# Patient Record
Sex: FEMALE | Race: WHITE | NOT HISPANIC OR LATINO | Employment: UNEMPLOYED | URBAN - NONMETROPOLITAN AREA
[De-identification: names, ages, dates, MRNs, and addresses within clinical notes are randomized per-mention and may not be internally consistent; named-entity substitution may affect disease eponyms.]

---

## 2021-08-25 ENCOUNTER — HOSPITAL ENCOUNTER (EMERGENCY)
Facility: HOSPITAL | Age: 31
Discharge: HOME | End: 2021-08-25
Attending: EMERGENCY MEDICINE
Payer: COMMERCIAL

## 2021-08-25 VITALS
BODY MASS INDEX: 23.55 KG/M2 | HEIGHT: 71 IN | HEART RATE: 105 BPM | SYSTOLIC BLOOD PRESSURE: 153 MMHG | DIASTOLIC BLOOD PRESSURE: 95 MMHG | TEMPERATURE: 98.7 F | RESPIRATION RATE: 19 BRPM | WEIGHT: 168.21 LBS | OXYGEN SATURATION: 99 %

## 2021-08-25 DIAGNOSIS — L30.9 DERMATITIS: Primary | ICD-10-CM

## 2021-08-25 PROCEDURE — 99284 EMERGENCY DEPT VISIT MOD MDM: CPT | Performed by: PHYSICIAN ASSISTANT

## 2021-08-25 PROCEDURE — 99283 EMERGENCY DEPT VISIT LOW MDM: CPT

## 2021-08-25 RX ORDER — PREDNISONE 20 MG/1
40 TABLET ORAL ONCE
Status: COMPLETED | OUTPATIENT
Start: 2021-08-25 | End: 2021-08-25

## 2021-08-25 RX ORDER — PREDNISONE 20 MG/1
40 TABLET ORAL DAILY
Qty: 8 TABLET | Refills: 0 | Status: SHIPPED | OUTPATIENT
Start: 2021-08-25 | End: 2021-08-29

## 2021-08-25 RX ADMIN — PREDNISONE 40 MG: 20 TABLET ORAL at 15:32

## 2021-08-25 NOTE — ED PROVIDER NOTES
History  Chief Complaint   Patient presents with    Rash     Pt reports an itchy red pin point rash noted to torso and legs since friday  Pt tried benadryl and OTC cortisone cream however no relief     27year old female presented to the ED for evaluation of rash  States she has a "red bumpy" rash on her torso and legs  Patient states rash is itchy  Denies any weeping, fevers, chills  States she has been taking benadryl and OTC cortisone cream without significant improvement  Notes she does spend time outside  She denies any new foods or personal hygiene products  No one at home with similar rash  No rash between fingers or toes        History provided by:  Parent  Rash  Location:  Torso and leg  Quality: itchiness and redness    Quality: not blistering, not bruising, not burning, not draining, not dry, not painful, not peeling, not scaling, not swelling and not weeping    Severity:  Moderate  Onset quality:  Gradual  Timing:  Constant  Progression:  Unchanged  Chronicity:  New  Context: plant contact and sun exposure    Context: not animal contact, not chemical exposure, not exposure to similar rash, not food, not medications, not new detergent/soap and not sick contacts    Relieved by:  Nothing  Worsened by:  Nothing  Ineffective treatments:  OTC analgesics  Associated symptoms: no abdominal pain, no diarrhea, no fatigue, no fever, no headaches, no hoarse voice, no induration, no joint pain, no myalgias, no nausea, no periorbital edema, no shortness of breath, no sore throat, no throat swelling, no tongue swelling, no URI, not vomiting and not wheezing        None       Past Medical History:   Diagnosis Date    Asthma        Past Surgical History:   Procedure Laterality Date    TONSILLECTOMY         No family history on file  I have reviewed and agree with the history as documented      E-Cigarette/Vaping     E-Cigarette/Vaping Substances     Social History     Tobacco Use    Smoking status: Never Smoker    Smokeless tobacco: Never Used   Substance Use Topics    Alcohol use: Not Currently    Drug use: Not Currently       Review of Systems   Constitutional: Negative  Negative for appetite change, chills, diaphoresis, fatigue and fever  HENT: Negative  Negative for hoarse voice and sore throat  Respiratory: Negative  Negative for shortness of breath and wheezing  Cardiovascular: Negative  Gastrointestinal: Negative  Negative for abdominal pain, diarrhea, nausea and vomiting  Musculoskeletal: Negative  Negative for arthralgias and myalgias  Skin: Positive for rash  Neurological: Negative  Negative for headaches  All other systems reviewed and are negative  Physical Exam  Physical Exam  Vitals and nursing note reviewed  Constitutional:       General: She is not in acute distress  Appearance: Normal appearance  She is normal weight  She is not ill-appearing, toxic-appearing or diaphoretic  HENT:      Head: Normocephalic and atraumatic  Nose: Nose normal       Mouth/Throat:      Mouth: Mucous membranes are moist       Pharynx: Oropharynx is clear  No oropharyngeal exudate or posterior oropharyngeal erythema  Eyes:      Extraocular Movements: Extraocular movements intact  Conjunctiva/sclera: Conjunctivae normal       Pupils: Pupils are equal, round, and reactive to light  Cardiovascular:      Rate and Rhythm: Normal rate and regular rhythm  Pulmonary:      Effort: Pulmonary effort is normal  No respiratory distress  Breath sounds: Normal breath sounds  No stridor  No wheezing, rhonchi or rales  Chest:      Chest wall: No tenderness  Musculoskeletal:         General: No swelling or tenderness  Normal range of motion  Cervical back: Normal range of motion  Skin:     General: Skin is warm and dry  Capillary Refill: Capillary refill takes less than 2 seconds  Findings: Rash present  Rash is macular and papular   Rash is not crusting, nodular, purpuric, pustular, scaling or urticarial       Comments: Diffuse maculopapular erythematous rash on torso and lower extremities  No rash between fingers or toes  Does not involve face  No swelling, drainage, warmth, tenderness  No open wounds   Neurological:      General: No focal deficit present  Mental Status: She is alert and oriented to person, place, and time  Psychiatric:         Mood and Affect: Mood normal          Behavior: Behavior normal          Vital Signs  ED Triage Vitals [08/25/21 1517]   Temperature Pulse Respirations Blood Pressure SpO2   98 7 °F (37 1 °C) 105 19 153/95 99 %      Temp Source Heart Rate Source Patient Position - Orthostatic VS BP Location FiO2 (%)   Temporal Monitor Sitting Right arm --      Pain Score       No Pain           Vitals:    08/25/21 1517   BP: 153/95   Pulse: 105   Patient Position - Orthostatic VS: Sitting         Visual Acuity      ED Medications  Medications   predniSONE tablet 40 mg (40 mg Oral Given 8/25/21 1532)       Diagnostic Studies  Results Reviewed     None                 No orders to display              Procedures  Procedures         ED Course                 MDM  Number of Diagnoses or Management Options  Dermatitis: new and requires workup  Diagnosis management comments: 27-year-old female presents emergency department for evaluation of rash  Vitals and medical record reviewed  On exam patient has maculopapular rash torso and legs  No warmth, open wounds, tenderness or signs of infection  Patient denies any fevers or chills  There is no drainage or area of fluctuance  Patient reports rash is itchy  Will treat dermatitis with prednisone  Will follow-up with PCP  Patient was educated on symptoms that require prompt return to the emergency department for further evaluation and verbalized understanding           Amount and/or Complexity of Data Reviewed  Review and summarize past medical records: yes        Disposition  Final diagnoses:

## 2021-08-25 NOTE — DISCHARGE INSTRUCTIONS
If you have any new or worsening symptoms please return immediately to the emergency department  Please continue to take over-the-counter Benadryl as needed for itch    Please follow-up with your family doctor

## 2022-11-21 ENCOUNTER — APPOINTMENT (EMERGENCY)
Dept: RADIOLOGY | Facility: HOSPITAL | Age: 32
End: 2022-11-21

## 2022-11-21 ENCOUNTER — OFFICE VISIT (OUTPATIENT)
Dept: URGENT CARE | Facility: CLINIC | Age: 32
End: 2022-11-21

## 2022-11-21 ENCOUNTER — APPOINTMENT (OUTPATIENT)
Dept: URGENT CARE | Facility: CLINIC | Age: 32
End: 2022-11-21

## 2022-11-21 ENCOUNTER — HOSPITAL ENCOUNTER (EMERGENCY)
Facility: HOSPITAL | Age: 32
Discharge: HOME | End: 2022-11-21
Attending: EMERGENCY MEDICINE

## 2022-11-21 ENCOUNTER — APPOINTMENT (EMERGENCY)
Dept: CT IMAGING | Facility: HOSPITAL | Age: 32
End: 2022-11-21

## 2022-11-21 ENCOUNTER — APPOINTMENT (EMERGENCY)
Dept: ULTRASOUND IMAGING | Facility: HOSPITAL | Age: 32
End: 2022-11-21

## 2022-11-21 VITALS
SYSTOLIC BLOOD PRESSURE: 127 MMHG | RESPIRATION RATE: 20 BRPM | DIASTOLIC BLOOD PRESSURE: 73 MMHG | HEIGHT: 72 IN | BODY MASS INDEX: 22.81 KG/M2 | HEART RATE: 125 BPM | TEMPERATURE: 102.4 F | OXYGEN SATURATION: 100 %

## 2022-11-21 VITALS
OXYGEN SATURATION: 98 % | BODY MASS INDEX: 30.49 KG/M2 | RESPIRATION RATE: 31 BRPM | TEMPERATURE: 99.8 F | SYSTOLIC BLOOD PRESSURE: 129 MMHG | HEIGHT: 72 IN | HEART RATE: 110 BPM | WEIGHT: 225.09 LBS | DIASTOLIC BLOOD PRESSURE: 63 MMHG

## 2022-11-21 VITALS
SYSTOLIC BLOOD PRESSURE: 123 MMHG | TEMPERATURE: 102.4 F | OXYGEN SATURATION: 98 % | DIASTOLIC BLOOD PRESSURE: 76 MMHG | HEART RATE: 124 BPM | BODY MASS INDEX: 22.81 KG/M2 | HEIGHT: 72 IN | RESPIRATION RATE: 20 BRPM

## 2022-11-21 DIAGNOSIS — R31.9 HEMATURIA, UNSPECIFIED TYPE: ICD-10-CM

## 2022-11-21 DIAGNOSIS — D25.9 UTERINE FIBROID: ICD-10-CM

## 2022-11-21 DIAGNOSIS — R50.9 FEVER: ICD-10-CM

## 2022-11-21 DIAGNOSIS — B34.9 VIRAL SYNDROME: Primary | ICD-10-CM

## 2022-11-21 DIAGNOSIS — U07.1 COVID: Primary | ICD-10-CM

## 2022-11-21 LAB
ALBUMIN SERPL BCP-MCNC: 4.1 G/DL (ref 3.5–5)
ALP SERPL-CCNC: 91 U/L (ref 46–116)
ALT SERPL W P-5'-P-CCNC: 24 U/L (ref 12–78)
ANION GAP SERPL CALCULATED.3IONS-SCNC: 12 MMOL/L (ref 4–13)
AST SERPL W P-5'-P-CCNC: 32 U/L (ref 5–45)
BACTERIA UR QL AUTO: NORMAL /HPF
BASOPHILS # BLD AUTO: 0.02 THOUSANDS/ÂΜL (ref 0–0.1)
BASOPHILS NFR BLD AUTO: 0 % (ref 0–1)
BILIRUB SERPL-MCNC: 0.56 MG/DL (ref 0.2–1)
BILIRUB UR QL STRIP: NEGATIVE
BUN SERPL-MCNC: 9 MG/DL (ref 5–25)
CALCIUM SERPL-MCNC: 9 MG/DL (ref 8.3–10.1)
CARDIAC TROPONIN I PNL SERPL HS: <2 NG/L
CHLORIDE SERPL-SCNC: 100 MMOL/L (ref 96–108)
CLARITY UR: CLEAR
CO2 SERPL-SCNC: 21 MMOL/L (ref 21–32)
COLOR UR: ABNORMAL
CREAT SERPL-MCNC: 0.97 MG/DL (ref 0.6–1.3)
EOSINOPHIL # BLD AUTO: 0.04 THOUSAND/ÂΜL (ref 0–0.61)
EOSINOPHIL NFR BLD AUTO: 1 % (ref 0–6)
ERYTHROCYTE [DISTWIDTH] IN BLOOD BY AUTOMATED COUNT: 13.4 % (ref 11.6–15.1)
EXT PREGNANCY TEST URINE: NEGATIVE
EXT. CONTROL: NORMAL
FLUAV RNA RESP QL NAA+PROBE: NEGATIVE
FLUBV RNA RESP QL NAA+PROBE: NEGATIVE
GFR SERPL CREATININE-BSD FRML MDRD: 76 ML/MIN/1.73SQ M
GLUCOSE SERPL-MCNC: 110 MG/DL (ref 65–140)
GLUCOSE UR STRIP-MCNC: NEGATIVE MG/DL
HCT VFR BLD AUTO: 31.1 % (ref 34.8–46.1)
HGB BLD-MCNC: 10.7 G/DL (ref 11.5–15.4)
HGB UR QL STRIP.AUTO: ABNORMAL
IMM GRANULOCYTES # BLD AUTO: 0.02 THOUSAND/UL (ref 0–0.2)
IMM GRANULOCYTES NFR BLD AUTO: 0 % (ref 0–2)
KETONES UR STRIP-MCNC: ABNORMAL MG/DL
LACTATE SERPL-SCNC: 0.8 MMOL/L (ref 0.5–2)
LACTATE SERPL-SCNC: 2.4 MMOL/L (ref 0.5–2)
LEUKOCYTE ESTERASE UR QL STRIP: NEGATIVE
LIPASE SERPL-CCNC: 111 U/L (ref 73–393)
LYMPHOCYTES # BLD AUTO: 0.24 THOUSANDS/ÂΜL (ref 0.6–4.47)
LYMPHOCYTES NFR BLD AUTO: 3 % (ref 14–44)
MCH RBC QN AUTO: 28.3 PG (ref 26.8–34.3)
MCHC RBC AUTO-ENTMCNC: 34.4 G/DL (ref 31.4–37.4)
MCV RBC AUTO: 82 FL (ref 82–98)
MONOCYTES # BLD AUTO: 0.75 THOUSAND/ÂΜL (ref 0.17–1.22)
MONOCYTES NFR BLD AUTO: 10 % (ref 4–12)
NEUTROPHILS # BLD AUTO: 6.32 THOUSANDS/ÂΜL (ref 1.85–7.62)
NEUTS SEG NFR BLD AUTO: 86 % (ref 43–75)
NITRITE UR QL STRIP: NEGATIVE
NON-SQ EPI CELLS URNS QL MICRO: NORMAL /HPF
NRBC BLD AUTO-RTO: 0 /100 WBCS
PH UR STRIP.AUTO: 6 [PH]
PLATELET # BLD AUTO: 224 THOUSANDS/UL (ref 149–390)
PMV BLD AUTO: 8.3 FL (ref 8.9–12.7)
POTASSIUM SERPL-SCNC: 3.9 MMOL/L (ref 3.5–5.3)
PROCALCITONIN SERPL-MCNC: 0.1 NG/ML
PROT SERPL-MCNC: 7.8 G/DL (ref 6.4–8.4)
PROT UR STRIP-MCNC: NEGATIVE MG/DL
RBC # BLD AUTO: 3.78 MILLION/UL (ref 3.81–5.12)
RBC #/AREA URNS AUTO: NORMAL /HPF
RSV RNA RESP QL NAA+PROBE: NEGATIVE
SARS-COV-2 RNA RESP QL NAA+PROBE: POSITIVE
SL AMB  POCT GLUCOSE, UA: NEGATIVE
SL AMB LEUKOCYTE ESTERASE,UA: NEGATIVE
SL AMB POCT BILIRUBIN,UA: NEGATIVE
SL AMB POCT BLOOD,UA: ABNORMAL
SL AMB POCT CLARITY,UA: CLEAR
SL AMB POCT COLOR,UA: YELLOW
SL AMB POCT KETONES,UA: NEGATIVE
SL AMB POCT NITRITE,UA: NEGATIVE
SL AMB POCT PH,UA: 8
SL AMB POCT SPECIFIC GRAVITY,UA: 1.01
SL AMB POCT URINE PROTEIN: NEGATIVE
SL AMB POCT UROBILINOGEN: NEGATIVE
SODIUM SERPL-SCNC: 133 MMOL/L (ref 135–147)
SP GR UR STRIP.AUTO: <=1.005 (ref 1–1.03)
UROBILINOGEN UR QL STRIP.AUTO: 0.2 E.U./DL
WBC # BLD AUTO: 7.39 THOUSAND/UL (ref 4.31–10.16)
WBC #/AREA URNS AUTO: NORMAL /HPF

## 2022-11-21 RX ORDER — KETOROLAC TROMETHAMINE 30 MG/ML
15 INJECTION, SOLUTION INTRAMUSCULAR; INTRAVENOUS ONCE
Status: COMPLETED | OUTPATIENT
Start: 2022-11-21 | End: 2022-11-21

## 2022-11-21 RX ORDER — SODIUM CHLORIDE 9 MG/ML
3 INJECTION INTRAVENOUS EVERY 12 HOURS SCHEDULED
Status: DISCONTINUED | OUTPATIENT
Start: 2022-11-21 | End: 2022-11-21 | Stop reason: HOSPADM

## 2022-11-21 RX ORDER — OMEPRAZOLE 40 MG/1
40 CAPSULE, DELAYED RELEASE ORAL DAILY
COMMUNITY

## 2022-11-21 RX ORDER — ONDANSETRON 2 MG/ML
4 INJECTION INTRAMUSCULAR; INTRAVENOUS ONCE
Status: COMPLETED | OUTPATIENT
Start: 2022-11-21 | End: 2022-11-21

## 2022-11-21 RX ORDER — ONDANSETRON 4 MG/1
4 TABLET, ORALLY DISINTEGRATING ORAL EVERY 6 HOURS PRN
Qty: 20 TABLET | Refills: 0 | Status: SHIPPED | OUTPATIENT
Start: 2022-11-21

## 2022-11-21 RX ORDER — ACETAMINOPHEN 325 MG/1
650 TABLET ORAL ONCE
Status: COMPLETED | OUTPATIENT
Start: 2022-11-21 | End: 2022-11-21

## 2022-11-21 RX ADMIN — SODIUM CHLORIDE, POTASSIUM CHLORIDE, SODIUM LACTATE AND CALCIUM CHLORIDE 1000 ML: 600; 310; 30; 20 INJECTION, SOLUTION INTRAVENOUS at 16:37

## 2022-11-21 RX ADMIN — ACETAMINOPHEN 650 MG: 325 TABLET ORAL at 15:25

## 2022-11-21 RX ADMIN — IOHEXOL 100 ML: 350 INJECTION, SOLUTION INTRAVENOUS at 15:54

## 2022-11-21 RX ADMIN — KETOROLAC TROMETHAMINE 15 MG: 30 INJECTION, SOLUTION INTRAMUSCULAR at 18:49

## 2022-11-21 RX ADMIN — ONDANSETRON 4 MG: 2 INJECTION INTRAMUSCULAR; INTRAVENOUS at 15:22

## 2022-11-21 RX ADMIN — SODIUM CHLORIDE 1000 ML: 0.9 INJECTION, SOLUTION INTRAVENOUS at 15:24

## 2022-11-21 RX ADMIN — ONDANSETRON 4 MG: 2 INJECTION INTRAMUSCULAR; INTRAVENOUS at 18:50

## 2022-11-21 NOTE — Clinical Note
Drake Andrei was seen and treated in our emergency department on 11/21/2022  Diagnosis:     Bridgett Fonseca  may return to work on return date  She may return on this date: 11/26/2022    She tested POSITIVE for COVID today (11/21/22) and she needs to quarantine and isolate for 5 days  If you have any questions or concerns, please don't hesitate to call        Poonam Bryant MD    ______________________________           _______________          _______________  Hospital Representative                              Date                                Time

## 2022-11-21 NOTE — PROGRESS NOTES
[facility]        NAME: Smitty Peabody is a 34 y o  female  : 1990    MRN: [id number]  DATE: 2022  TIME: 2:30 PM    Assessment and Plan   Viral syndrome [B34 9]  1  Viral syndrome  POCT urine dip    Cov/Flu-Collected at [facility]      2  Hematuria, unspecified type  Transfer to other facility        Point of care urine dip showing moderate blood, negative nitrites, and negative leukocytes  COVID/Flu PCR pending  Results will be viewable via [employer]  Symptoms could be related to viral etiology however given moderate hematuria and acute onset of symptoms, concern for potential pyelonephritis or nephrolithiasis  Patient requires higher level of care and potential imaging  Patient referred to emergency department for further evaluation and management of her symptoms  Patient agreeable to present to ED  Complete assessment deferred to ED  Patient Instructions       COVID/Flu PCR pending  Results will be available via [employer]  Moderate blood on urine dip  Proceed to ER for further evaluation and management of your symptoms     Chief Complaint     Chief Complaint   Patient presents with   • Cold Like Symptoms     C/o body aches, chills, and upper abdominal pain radiating to her back since this morning  Was tested for covid this morning which came back negative  History of Present Illness       71-year-old female presents with complaints of sudden onset left-sided abdominal pain, back pain, body ache, nausea, fever, and chills x1 day  Denies any diarrhea but states loose stools at baseline  She further denies any vomiting or urinary symptoms  She states positive sick contacts as son is currently ill  She also admits to sore throat and some congestion last week, which has since resolved  Review of Systems   Review of Systems   Constitutional: Positive for chills and fever     HENT: Negative for congestion, postnasal drip, sore throat and trouble swallowing  Respiratory: Negative for shortness of breath  Cardiovascular: Negative for chest pain  Gastrointestinal: Positive for abdominal pain and nausea  Negative for diarrhea and vomiting  Genitourinary: Negative for difficulty urinating and dysuria  Musculoskeletal: Positive for back pain and myalgias  Current Medications       Current Outpatient Medications:   •  omeprazole (PriLOSEC) 40 MG capsule, Take 40 mg by mouth daily, Disp: , Rfl:     Current Allergies     Allergies as of 11/21/2022   • (No Known Allergies)            The following portions of the patient's history were reviewed and updated as appropriate: allergies, current medications, past family history, past medical history, past social history, past surgical history and problem list      Past Medical History:   Diagnosis Date   • Asthma        Past Surgical History:   Procedure Laterality Date   • TONSILLECTOMY         Family History   Problem Relation Age of Onset   • Hyperlipidemia Mother    • Diabetes Mother    • No Known Problems Father          Medications have been verified  Objective   /76   Pulse (!) 124   Temp (!) 102 4 °F (39 1 °C)   Resp 20   Ht 6' (1 829 m)   SpO2 98%   BMI 22 81 kg/m²   No LMP recorded  Patient has had an implant  Physical Exam     Physical Exam  Vitals and nursing note reviewed  Constitutional:       General: She is not in acute distress  Appearance: She is not toxic-appearing  HENT:      Head: Normocephalic  Eyes:      Conjunctiva/sclera: Conjunctivae normal    Cardiovascular:      Rate and Rhythm: Regular rhythm  Tachycardia present  Heart sounds: Normal heart sounds  Pulmonary:      Effort: Pulmonary effort is normal  No respiratory distress  Breath sounds: Normal breath sounds  No stridor  No wheezing, rhonchi or rales  Abdominal:      General: Bowel sounds are normal       Palpations: Abdomen is soft  Tenderness:  There is abdominal tenderness in the epigastric area, suprapubic area, left upper quadrant and left lower quadrant  There is right CVA tenderness and left CVA tenderness  Musculoskeletal:         General: Tenderness present  Lumbar back: Tenderness present  No bony tenderness  Skin:     General: Skin is warm and dry  Neurological:      Mental Status: She is alert  Gait: Gait is intact     Psychiatric:         Mood and Affect: Mood normal          Behavior: Behavior normal

## 2022-11-21 NOTE — PATIENT INSTRUCTIONS
COVID/Flu PCR pending  Results will be available via [employer]     Moderate blood on urine dip  Proceed to ER for further evaluation and management of your symptoms

## 2022-11-21 NOTE — Clinical Note
Teddy Gonzalez was seen and treated in our emergency department on 11/21/2022  Diagnosis:     Herminia Ramon  may return to work on return date  She may return on this date: 11/26/2022         If you have any questions or concerns, please don't hesitate to call        Jono Benavides MD    ______________________________           _______________          _______________  Hospital Representative                              Date                                Time

## 2022-11-21 NOTE — DISCHARGE INSTRUCTIONS
Your ultrasound showed a submucosal uterine fibroid which should be followed up by your OB/GYN  You also tested positive for COVID  Quarantine for 5 days from others from the onset of symptoms  This would be from 11/21-11/25 (5 full days)  Isolation separates sick people with a contagious disease from people who are not sick  Quarantine separates and restricts the movement of people who were exposed to a contagious disease to see if they become sick  -------------------------------------------------------------  IF your symptoms are improving,  You may end isolation after day 5 if:  You are fever-free for 24 hours (without the use of fever-reducing medication)  IF your symptoms are not improving,  Continue to isolate until:  You are fever-free for 24 hours (without the use of fever-reducing medication)  Your symptoms are improving   -------------------------------------------------------------  IF you had symptoms and had: Moderate illness (you experienced shortness of breath or had difficulty breathing)  - You need to isolate through day 10  Severe illness (you were hospitalized) or have a weakened immune system  You need to isolate through day 10  Consult your doctor before ending isolation  Ending isolation without a viral test may not be an option for you   --------------------------------------------------------------    Regardless of when you end isolation    Until at least day 11:  Avoid being around people who are more likely to get very sick from COVID-19  Remember to wear a high-quality mask when indoors around others at home and in public  Do not go places where you are unable to wear a mask until you are able to discontinue masking (see below)  After you have ended isolation, when you are feeling better (no fever without the use of fever-reducing medications and symptoms improving),  Wear your mask through day 10    OR  If you have access to antigen tests, you should consider using them  With two sequential negative tests 48 hours apart, you may remove your mask sooner than day 10       [employer]    [email or web address]  [email or web address]    COVID 23 Hotlines:  [address]    [address] [facility] [phone] Option #7

## 2022-11-21 NOTE — ED PROVIDER NOTES
History  Chief Complaint   Patient presents with   • Back Pain     Pt reports lower back pain, nausea, vomiting, and fevers, [place] [facility] [place]      HPI  32F w hx of asthma presenting with back pain and fevers  Symptoms started this morning at 930am while patient was working  Developed sudden onset of chills, fevers, back pain  Pain described as sharp sensation and radiates to lower back and upper back  +headaches, myalgias, and shortness of breath  She went to urgent care today and had urine testing done; patient was told she had blood in her urine and advised to be seen in the ED  Did not take any medications prior to coming to the ED  Says she has had kidney infection years ago, and symptoms feel similar  Never had kidney stones  Denies cough, rhinorrhea, dysuria  Prior to Admission Medications   Prescriptions Last Dose Informant Patient Reported? Taking?   omeprazole (PriLOSEC) 40 MG capsule   Yes No   Sig: Take 40 mg by mouth daily      Facility-Administered Medications: None       Past Medical History:   Diagnosis Date   • Asthma        Past Surgical History:   Procedure Laterality Date   • TONSILLECTOMY         Family History   Problem Relation Age of Onset   • Hyperlipidemia Mother    • Diabetes Mother    • No Known Problems Father      I have reviewed and agree with the history as documented  E-Cigarette/Vaping   • E-Cigarette Use Never User      E-Cigarette/Vaping Substances     Social History     Tobacco Use   • Smoking status: Never   • Smokeless tobacco: Never   Vaping Use   • Vaping Use: Never used   Substance Use Topics   • Alcohol use: Not Currently   • Drug use: Yes     Types: Marijuana     Comment: Medical card       Review of Systems   Constitutional: Positive for appetite change, chills and fever  HENT: Negative for ear pain and sore throat  Eyes: Negative for pain and visual disturbance  Respiratory: Negative for cough and shortness of breath      Cardiovascular: Negative for chest pain and palpitations  Gastrointestinal: Positive for abdominal pain and nausea  Negative for vomiting  Genitourinary: Positive for flank pain and hematuria  Negative for dysuria  Musculoskeletal: Positive for back pain and myalgias  Negative for arthralgias  Skin: Negative for color change and rash  Neurological: Positive for headaches  Negative for seizures and syncope  All other systems reviewed and are negative  Physical Exam  Physical Exam  Vitals and nursing note reviewed  Constitutional:       General: She is not in acute distress  Appearance: She is well-developed  HENT:      Head: Normocephalic and atraumatic  Right Ear: External ear normal       Left Ear: External ear normal       Nose: Nose normal    Eyes:      Conjunctiva/sclera: Conjunctivae normal    Cardiovascular:      Rate and Rhythm: Regular rhythm  Tachycardia present  Pulmonary:      Effort: Pulmonary effort is normal  No respiratory distress  Breath sounds: Normal breath sounds  Abdominal:      Palpations: Abdomen is soft  Tenderness: There is abdominal tenderness in the epigastric area and periumbilical area  There is right CVA tenderness and left CVA tenderness  Musculoskeletal:      Cervical back: Normal range of motion and neck supple  Skin:     General: Skin is warm and dry  Neurological:      General: No focal deficit present  Mental Status: She is alert  Mental status is at baseline         Vital Signs  ED Triage Vitals   Temperature Pulse Respirations Blood Pressure SpO2   11/21/22 1457 11/21/22 1457 11/21/22 1457 11/21/22 1457 11/21/22 1457   (!) 101 6 °F (38 7 °C) (!) 138 20 153/72 99 %      Temp Source Heart Rate Source Patient Position - Orthostatic VS BP Location FiO2 (%)   11/21/22 1457 11/21/22 1457 11/21/22 1457 11/21/22 1457 --   Temporal Monitor Sitting Left arm       Pain Score       11/21/22 1525       Med Not Given for Pain - for MAR use only           Vitals:    11/21/22 1915 11/21/22 1930 11/21/22 1945 11/21/22 2000   BP: 143/57 149/65 129/63    Pulse: 101 (!) 112 103 (!) 110   Patient Position - Orthostatic VS:           Visual Acuity      ED Medications  Medications   acetaminophen (TYLENOL) tablet 650 mg (650 mg Oral Given 11/21/22 1525)   sodium chloride 0 9 % bolus 1,000 mL (0 mL Intravenous Stopped 11/21/22 1625)   ondansetron (ZOFRAN) injection 4 mg (4 mg Intravenous Given 11/21/22 1522)   iohexol (OMNIPAQUE) 350 MG/ML injection (SINGLE-DOSE) 100 mL (100 mL Intravenous Given 11/21/22 1554)   lactated ringers bolus 1,000 mL (0 mL Intravenous Stopped 11/21/22 1810)   ondansetron (ZOFRAN) injection 4 mg (4 mg Intravenous Given 11/21/22 1850)   ketorolac (TORADOL) injection 15 mg (15 mg Intravenous Given 11/21/22 1849)       Diagnostic Studies  Results Reviewed     Procedure Component Value Units Date/Time    FLU/RSV/COVID - if FLU/RSV clinically relevant [[id number]]  (Abnormal) Collected: 11/21/22 1735    Lab Status: Final result Specimen: Nares from Nose Updated: 11/21/22 1821     SARS-CoV-2 Positive     INFLUENZA A PCR Negative     INFLUENZA B PCR Negative     RSV PCR Negative    Narrative:      FOR PEDIATRIC PATIENTS - copy/paste COVID Guidelines URL to browser: [email or web address]    SARS-CoV-2 assay is a Nucleic Acid Amplification assay intended for the  qualitative detection of nucleic acid from SARS-CoV-2 in nasopharyngeal  swabs  Results are for the presumptive identification of SARS-CoV-2 RNA  Positive results are indicative of infection with SARS-CoV-2, the virus  causing COVID-19, but do not rule out bacterial infection or co-infection  with other viruses  Laboratories within the [place] and its  territories are required to report all positive results to the appropriate  public health authorities   Negative results do not preclude SARS-CoV-2  infection and should not be used as the sole basis for treatment or other  patient management decisions  Negative results must be combined with  clinical observations, patient history, and epidemiological information  This test has not been FDA cleared or approved  This test has been authorized by FDA under an Emergency Use Authorization  (EUA)  This test is only authorized for the duration of time the  declaration that circumstances exist justifying the authorization of the  emergency use of an in vitro diagnostic tests for detection of SARS-CoV-2  virus and/or diagnosis of COVID-19 infection under section 564(b)(1) of  the Act, 21 U  S C  710BSX-4(B)(8), unless the authorization is terminated  or revoked sooner  The test has been validated but independent review by FDA  and CLIA is pending  Test performed using [employer] GeneXpert: This RT-PCR assay targets N2,  a region unique to SARS-CoV-2  A conserved region in the E-gene was chosen  for pan-Sarbecovirus detection which includes SARS-CoV-2  According to CMS-2020-01-R, this platform meets the definition of high-throughput technology  Procalcitonin [[id number]]  (Normal) Collected: 11/21/22 1513    Lab Status: Final result Specimen: Blood from Arm, Left Updated: 11/21/22 1815     Procalcitonin 0 10 ng/ml     Lactic acid 2 Hours [[id number]]  (Normal) Collected: 11/21/22 1735    Lab Status: Final result Specimen: Blood from Hand, Left Updated: 11/21/22 1801     LACTIC ACID 0 8 mmol/L     Narrative:      Result may be elevated if tourniquet was used during collection      CBC and differential [[id number]]  (Abnormal) Collected: 11/21/22 1625    Lab Status: Final result Specimen: Blood from Hand, Left Updated: 11/21/22 1629     WBC 7 39 Thousand/uL      RBC 3 78 Million/uL      Hemoglobin 10 7 g/dL      Hematocrit 31 1 %      MCV 82 fL      MCH 28 3 pg      MCHC 34 4 g/dL      RDW 13 4 %      MPV 8 3 fL      Platelets 082 Thousands/uL      nRBC 0 /100 WBCs      Neutrophils Relative 86 %      Immat GRANS % 0 % Lymphocytes Relative 3 %      Monocytes Relative 10 %      Eosinophils Relative 1 %      Basophils Relative 0 %      Neutrophils Absolute 6 32 Thousands/µL      Immature Grans Absolute 0 02 Thousand/uL      Lymphocytes Absolute 0 24 Thousands/µL      Monocytes Absolute 0 75 Thousand/µL      Eosinophils Absolute 0 04 Thousand/µL      Basophils Absolute 0 02 Thousands/µL     Lactic acid [[id number]]  (Abnormal) Collected: 11/21/22 1513    Lab Status: Final result Specimen: Blood from Arm, Left Updated: 11/21/22 1558     LACTIC ACID 2 4 mmol/L     Narrative:      Result may be elevated if tourniquet was used during collection      Urine Microscopic [[id number]]  (Normal) Collected: 11/21/22 1531    Lab Status: Final result Specimen: Urine, Clean Catch Updated: 11/21/22 1558     RBC, UA 2-4 /hpf      WBC, UA 0-1 /hpf      Epithelial Cells Occasional /hpf      Bacteria, UA None Seen /hpf     HS Troponin 0hr (reflex protocol) [[id number]]  (Normal) Collected: 11/21/22 1513    Lab Status: Final result Specimen: Blood from Arm, Left Updated: 11/21/22 1552     hs TnI 0hr <2 ng/L     Comprehensive metabolic panel [[id number]]  (Abnormal) Collected: 11/21/22 1513    Lab Status: Final result Specimen: Blood from Arm, Left Updated: 11/21/22 1548     Sodium 133 mmol/L      Potassium 3 9 mmol/L      Chloride 100 mmol/L      CO2 21 mmol/L      ANION GAP 12 mmol/L      BUN 9 mg/dL      Creatinine 0 97 mg/dL      Glucose 110 mg/dL      Calcium 9 0 mg/dL      AST 32 U/L      ALT 24 U/L      Alkaline Phosphatase 91 U/L      Total Protein 7 8 g/dL      Albumin 4 1 g/dL      Total Bilirubin 0 56 mg/dL      eGFR 76 ml/min/1 73sq m     Narrative:      [facility] guidelines for Chronic Kidney Disease (CKD):   •  Stage 1 with normal or high GFR (GFR > 90 mL/min/1 73 square meters)  •  Stage 2 Mild CKD (GFR = 60-89 mL/min/1 73 square meters)  •  Stage 3A Moderate CKD (GFR = 45-59 mL/min/1 73 square meters)  •  Stage 3B Moderate CKD (GFR = 30-44 mL/min/1 73 square meters)  •  Stage 4 Severe CKD (GFR = 15-29 mL/min/1 73 square meters)  •  Stage 5 End Stage CKD (GFR <15 mL/min/1 73 square meters)  Note: GFR calculation is accurate only with a steady state creatinine    Lipase [[id number]]  (Normal) Collected: 11/21/22 1513    Lab Status: Final result Specimen: Blood from Arm, Left Updated: 11/21/22 1548     Lipase 111 u/L     UA w Reflex to Microscopic w Reflex to Culture [[id number]]  (Abnormal) Collected: 11/21/22 1531    Lab Status: Final result Specimen: Urine, Clean Catch Updated: 11/21/22 1540     Color, UA Straw     Clarity, UA Clear     Specific Gravity, UA <=1 005     pH, UA 6 0     Leukocytes, UA Negative     Nitrite, UA Negative     Protein, UA Negative mg/dl      Glucose, UA Negative mg/dl      Ketones, UA Trace mg/dl      Urobilinogen, UA 0 2 E U /dl      Bilirubin, UA Negative     Occult Blood, UA Moderate    POCT pregnancy, urine [[id number]]  (Normal) Resulted: 11/21/22 1532    Lab Status: Final result Updated: 11/21/22 1532     EXT Preg Test, Ur Negative     Control Valid    Blood culture #2 [[id number]] Collected: 11/21/22 1522    Lab Status: In process Specimen: Blood from Hand, Right Updated: 11/21/22 1527    Blood culture #1 [[id number]] Collected: 11/21/22 1513    Lab Status: In process Specimen: Blood from Arm, Left Updated: 11/21/22 1527    FLU/RSV/COVID - if FLU/RSV clinically relevant [[id number]]     Lab Status: No result Specimen: Nares from Nose              US pelvis complete w transvaginal   Final Result by French Hoffmann MD (11/21 1934)       Submucosal uterine fibroid measuring approximately 6 cm distorts the endometrium which is not well visualized    Both the IUD and the the endometrium are displaced to the left as better seen on today's earlier CT            Workstation performed: [id number]         CT abdomen pelvis with contrast   Final Result by Sabine Goodrich MD (11/21 1628) Abnormal uterine shape may be due to a fibroid which may be displacing the endometrium and intrauterine device to the left  Pelvic ultrasound is recommended for further assessment  The study was marked in [facility] for immediate notification  Workstation performed: [id number]         XR chest 1 view portable   Final Result by Tyrone Santos MD (11/21 1538)   No acute cardiopulmonary findings  Workstation performed: [id number]                Procedures  ECG 12 Lead Documentation Only    Date/Time: 11/21/2022 4:17 PM  Performed by: Wai Terry MD  Authorized by: Wai Terry MD     Patient location:  ED  Interpretation:     Interpretation: normal    Rate:     ECG rate:  108    ECG rate assessment: tachycardic    Rhythm:     Rhythm: sinus rhythm    Ectopy:     Ectopy: none    QRS:     QRS axis:  Normal  Conduction:     Conduction: normal    ST segments:     ST segments:  Normal  T waves:     T waves: normal        ED Course  ED Course as of 11/21/22 2249   Mon Nov 21, 2022   1557 CXR  IMPRESSION:  No acute cardiopulmonary findings  1558 hs TnI 0hr: <2   1558 Lipase: 111   1617 LACTIC ACID(!!): 2 4  Patient receiving IV fluids  1830 SARS-COV-2(!): Positive  Likely the cause of patient's symptoms  1952 Pelvic US  IMPRESSION:  Submucosal uterine fibroid measuring approximately 6 cm distorts the endometrium which is not well visualized  Both the IUD and the the endometrium are displaced to the left as better seen on today's earlier CT     MDM  32F presenting with fever, tachycardia  IV fluids given for rehydration and tachycardia, and Tylenol given as antipyretic  Labwork significant for mild anemia w Hgb 10 7  WBC wnl  Mild hyponatremia 133  Procalcitonin wnl  Lactate 2 4, and repeat improved to 0 8 after fluids  Troponin <2  Urinalysis w/o signs of infection  CXR w/o acute cardiopulm findings   CT abd/pelvis significant for abnormal uterine shape displacing the endometrium and intrauterine device, and pelvis US was recommende  The CT scan was discussed w patient and she was agreeable to pelvic ultrasound  Ultrasound was significant for uterine fibroid which I discussed w patietn  She has OB/GYN she is able to follow-up with  Patient tested positive for COVID explains patient's multiple symptoms and fever  I offered Paxlovid, however patient declines after she read about the side effects  I recommended OTC medications for symptoms  Discharged in stable condition  Return precautions advised  Disposition  Final diagnoses:   Fever   COVID   Uterine fibroid     Time reflects when diagnosis was documented in both MDM as applicable and the Disposition within this note     Time User Action Codes Description Comment    11/21/2022  5:43 PM Lees Teo Add [R50 9] Fever     11/21/2022  6:32 PM Lees Teo Add [U07 1] COVID     11/21/2022  6:32 PM Lees Teo Modify [R50 9] Fever     11/21/2022  6:32 PM Lees Teo Modify [U07 1] COVID     11/21/2022  7:57 PM Lees Teo Add [D21 9] Fibroid     11/21/2022  7:57 PM Lees Teo Remove [D21 9] Fibroid     11/21/2022  7:57 PM Lees Teo Add [D25 9] Uterine fibroid       ED Disposition     ED Disposition   Discharge    Condition   Stable    Date/Time   Mon Nov 21, 2022  6:32 PM    Comment   Reina Morillo discharge to home/self care  Follow-up Information    None         Discharge Medication List as of 11/21/2022  7:58 PM      START taking these medications    Details   ondansetron (Zofran ODT) 4 mg disintegrating tablet Take 1 tablet (4 mg total) by mouth every 6 (six) hours as needed for nausea or vomiting, Starting Mon 11/21/2022, Normal         CONTINUE these medications which have NOT CHANGED    Details   omeprazole (PriLOSEC) 40 MG capsule Take 40 mg by mouth daily, Historical Med             No discharge procedures on file      PDMP Review     None          ED Provider  Electronically Signed by           Kiel Richmond MD  11/21/22 0198

## 2022-11-22 LAB
FLUAV RNA RESP QL NAA+PROBE: NEGATIVE
FLUBV RNA RESP QL NAA+PROBE: NEGATIVE
SARS-COV-2 RNA RESP QL NAA+PROBE: POSITIVE

## 2022-11-23 LAB
ATRIAL RATE: 108 BPM
P AXIS: 54 DEGREES
PR INTERVAL: 162 MS
QRS AXIS: 2 DEGREES
QRSD INTERVAL: 90 MS
QT INTERVAL: 340 MS
QTC INTERVAL: 455 MS
T WAVE AXIS: 5 DEGREES
VENTRICULAR RATE: 108 BPM

## 2022-11-26 LAB
BACTERIA BLD CULT: NORMAL
BACTERIA BLD CULT: NORMAL

## 2025-04-20 ENCOUNTER — APPOINTMENT (EMERGENCY)
Dept: CT IMAGING | Facility: HOSPITAL | Age: 35
End: 2025-04-20
Payer: COMMERCIAL

## 2025-04-20 ENCOUNTER — HOSPITAL ENCOUNTER (EMERGENCY)
Facility: HOSPITAL | Age: 35
Discharge: HOME | End: 2025-04-20
Payer: COMMERCIAL

## 2025-04-20 VITALS
OXYGEN SATURATION: 95 % | RESPIRATION RATE: 18 BRPM | DIASTOLIC BLOOD PRESSURE: 69 MMHG | SYSTOLIC BLOOD PRESSURE: 118 MMHG | TEMPERATURE: 98.8 F | HEART RATE: 80 BPM

## 2025-04-20 DIAGNOSIS — R51.9 HEADACHE: Primary | ICD-10-CM

## 2025-04-20 LAB
ALBUMIN SERPL BCG-MCNC: 4.4 G/DL (ref 3.5–5)
ALP SERPL-CCNC: 90 U/L (ref 34–104)
ALT SERPL W P-5'-P-CCNC: 24 U/L (ref 7–52)
ANION GAP SERPL CALCULATED.3IONS-SCNC: 8 MMOL/L (ref 4–13)
AST SERPL W P-5'-P-CCNC: 23 U/L (ref 13–39)
BASOPHILS # BLD AUTO: 0.06 THOUSANDS/ÂΜL (ref 0–0.1)
BASOPHILS NFR BLD AUTO: 1 % (ref 0–1)
BILIRUB SERPL-MCNC: 0.44 MG/DL (ref 0.2–1)
BUN SERPL-MCNC: 10 MG/DL (ref 5–25)
CALCIUM SERPL-MCNC: 9.4 MG/DL (ref 8.4–10.2)
CARDIAC TROPONIN I PNL SERPL HS: <2 NG/L (ref ?–50)
CHLORIDE SERPL-SCNC: 104 MMOL/L (ref 96–108)
CO2 SERPL-SCNC: 24 MMOL/L (ref 21–32)
CREAT SERPL-MCNC: 0.96 MG/DL (ref 0.6–1.3)
EOSINOPHIL # BLD AUTO: 0.16 THOUSAND/ÂΜL (ref 0–0.61)
EOSINOPHIL NFR BLD AUTO: 1 % (ref 0–6)
ERYTHROCYTE [DISTWIDTH] IN BLOOD BY AUTOMATED COUNT: 13.6 % (ref 11.6–15.1)
GFR SERPL CREATININE-BSD FRML MDRD: 76 ML/MIN/1.73SQ M
GLUCOSE SERPL-MCNC: 115 MG/DL (ref 65–140)
HCT VFR BLD AUTO: 33.4 % (ref 34.8–46.1)
HGB BLD-MCNC: 11.4 G/DL (ref 11.5–15.4)
IMM GRANULOCYTES # BLD AUTO: 0.04 THOUSAND/UL (ref 0–0.2)
IMM GRANULOCYTES NFR BLD AUTO: 0 % (ref 0–2)
LYMPHOCYTES # BLD AUTO: 1.96 THOUSANDS/ÂΜL (ref 0.6–4.47)
LYMPHOCYTES NFR BLD AUTO: 17 % (ref 14–44)
MCH RBC QN AUTO: 28.6 PG (ref 26.8–34.3)
MCHC RBC AUTO-ENTMCNC: 34.1 G/DL (ref 31.4–37.4)
MCV RBC AUTO: 84 FL (ref 82–98)
MONOCYTES # BLD AUTO: 0.56 THOUSAND/ÂΜL (ref 0.17–1.22)
MONOCYTES NFR BLD AUTO: 5 % (ref 4–12)
NEUTROPHILS # BLD AUTO: 8.61 THOUSANDS/ÂΜL (ref 1.85–7.62)
NEUTS SEG NFR BLD AUTO: 76 % (ref 43–75)
NRBC BLD AUTO-RTO: 0 /100 WBCS
PLATELET # BLD AUTO: 341 THOUSANDS/UL (ref 149–390)
PMV BLD AUTO: 8.4 FL (ref 8.9–12.7)
POTASSIUM SERPL-SCNC: 3.5 MMOL/L (ref 3.5–5.3)
PROT SERPL-MCNC: 7 G/DL (ref 6.4–8.4)
RBC # BLD AUTO: 3.99 MILLION/UL (ref 3.81–5.12)
SODIUM SERPL-SCNC: 136 MMOL/L (ref 135–147)
WBC # BLD AUTO: 11.39 THOUSAND/UL (ref 4.31–10.16)

## 2025-04-20 PROCEDURE — 99283 EMERGENCY DEPT VISIT LOW MDM: CPT

## 2025-04-20 PROCEDURE — 80053 COMPREHEN METABOLIC PANEL: CPT | Performed by: PHYSICIAN ASSISTANT

## 2025-04-20 PROCEDURE — 85025 COMPLETE CBC W/AUTO DIFF WBC: CPT | Performed by: PHYSICIAN ASSISTANT

## 2025-04-20 PROCEDURE — 96365 THER/PROPH/DIAG IV INF INIT: CPT

## 2025-04-20 PROCEDURE — 36415 COLL VENOUS BLD VENIPUNCTURE: CPT | Performed by: PHYSICIAN ASSISTANT

## 2025-04-20 PROCEDURE — 99285 EMERGENCY DEPT VISIT HI MDM: CPT | Performed by: PHYSICIAN ASSISTANT

## 2025-04-20 PROCEDURE — 84484 ASSAY OF TROPONIN QUANT: CPT | Performed by: PHYSICIAN ASSISTANT

## 2025-04-20 PROCEDURE — 96375 TX/PRO/DX INJ NEW DRUG ADDON: CPT

## 2025-04-20 PROCEDURE — 70450 CT HEAD/BRAIN W/O DYE: CPT

## 2025-04-20 RX ORDER — MAGNESIUM SULFATE 1 G/100ML
1 INJECTION INTRAVENOUS ONCE
Status: COMPLETED | OUTPATIENT
Start: 2025-04-20 | End: 2025-04-20

## 2025-04-20 RX ORDER — ACETAMINOPHEN 10 MG/ML
1000 INJECTION, SOLUTION INTRAVENOUS ONCE
Status: COMPLETED | OUTPATIENT
Start: 2025-04-20 | End: 2025-04-20

## 2025-04-20 RX ORDER — DIPHENHYDRAMINE HYDROCHLORIDE 50 MG/ML
25 INJECTION, SOLUTION INTRAMUSCULAR; INTRAVENOUS ONCE
Status: COMPLETED | OUTPATIENT
Start: 2025-04-20 | End: 2025-04-20

## 2025-04-20 RX ORDER — METOCLOPRAMIDE HYDROCHLORIDE 5 MG/ML
10 INJECTION INTRAMUSCULAR; INTRAVENOUS ONCE
Status: COMPLETED | OUTPATIENT
Start: 2025-04-20 | End: 2025-04-20

## 2025-04-20 RX ADMIN — DIPHENHYDRAMINE HYDROCHLORIDE 25 MG: 50 INJECTION, SOLUTION INTRAMUSCULAR; INTRAVENOUS at 17:09

## 2025-04-20 RX ADMIN — SODIUM CHLORIDE 1000 ML: 0.9 INJECTION, SOLUTION INTRAVENOUS at 17:08

## 2025-04-20 RX ADMIN — METOCLOPRAMIDE HYDROCHLORIDE 10 MG: 5 INJECTION INTRAMUSCULAR; INTRAVENOUS at 17:10

## 2025-04-20 RX ADMIN — MAGNESIUM SULFATE HEPTAHYDRATE 1 G: 1 INJECTION, SOLUTION INTRAVENOUS at 17:12

## 2025-04-20 RX ADMIN — ACETAMINOPHEN 1000 MG: 10 INJECTION INTRAVENOUS at 17:10

## 2025-04-20 NOTE — ED PROVIDER NOTES
Time reflects when diagnosis was documented in both MDM as applicable and the Disposition within this note       Time User Action Codes Description Comment    4/20/2025  6:21 PM Manasa Motta Add [R51.9] Headache           ED Disposition       ED Disposition   Discharge    Condition   Stable    Date/Time   Sun Apr 20, 2025  6:21 PM    Comment   Mattie Olveravelia discharge to home/self care.                   Assessment & Plan       Medical Decision Making  Patient presented to the emergency department and a MSE was performed. The patient was evaluated for complaint related to acute headache. Patient is potentially at risk for, but not limited to, tension headache, cluster migraine headache, subarachnoid hemorrhage, traumatic intracranial injury, other intracranial hemorrhage or intracranial infectious process. Several of these diagnoses have been evaluated and ruled out by history and physical. As needed, patient will be further evaluated with laboratory and imaging studies. Higher level diagnostics, such as CT imaging or ultrasound, may also be required. Please see work-up portion of the note for further evaluation of patient's risk. Socioeconomic factors were also considered as part of the decision-making process. Unless otherwise stated in the chart or patient is admitted as elsewhere documented, any previously prescribed medications will be maintained.       Problems Addressed:  Headache: acute illness or injury    Amount and/or Complexity of Data Reviewed  Labs: ordered.  Radiology: ordered.    Risk  Prescription drug management.        ED Course as of 04/20/25 2025   Sun Apr 20, 2025   1744 CT head: No acute intracranial abnormality.   1754 I discussed all results and findings with the patient including symptomatic treatment at home return precautions and appropriate follow-up and she verbalized understanding.  Patient significant improvement of symptoms while in emergency department.  At this point she is  "clinically hemodynamically stable for discharge       Medications   sodium chloride 0.9 % bolus 1,000 mL (0 mL Intravenous Stopped 4/20/25 1808)   metoclopramide (REGLAN) injection 10 mg (10 mg Intravenous Given 4/20/25 1710)   diphenhydrAMINE (BENADRYL) injection 25 mg (25 mg Intravenous Given 4/20/25 1709)   magnesium sulfate IVPB (premix) SOLN 1 g (0 g Intravenous Stopped 4/20/25 1812)   acetaminophen (Ofirmev) injection 1,000 mg (0 mg Intravenous Stopped 4/20/25 1725)       ED Risk Strat Scores                    No data recorded        SBIRT 22yo+      Flowsheet Row Most Recent Value   Initial Alcohol Screen: US AUDIT-C     1. How often do you have a drink containing alcohol? 0 Filed at: 04/20/2025 1653   2. How many drinks containing alcohol do you have on a typical day you are drinking?  0 Filed at: 04/20/2025 1653   3b. FEMALE Any Age, or MALE 65+: How often do you have 4 or more drinks on one occassion? 0 Filed at: 04/20/2025 1653   Audit-C Score 0 Filed at: 04/20/2025 1653   LI: How many times in the past year have you...    Used an illegal drug or used a prescription medication for non-medical reasons? Never Filed at: 04/20/2025 1653                            History of Present Illness       Chief Complaint   Patient presents with    Headache     Patient states she has been having headaches and nausea for the past few days. States when her \"blood pressure goes up she gets headache behind her eyes and nausea\".        Past Medical History:   Diagnosis Date    Asthma     Fibroid, uterine     GERD (gastroesophageal reflux disease)       Past Surgical History:   Procedure Laterality Date    TONSILLECTOMY        Family History   Problem Relation Age of Onset    Hyperlipidemia Mother     Diabetes Mother     No Known Problems Father       Social History     Tobacco Use    Smoking status: Never    Smokeless tobacco: Never   Vaping Use    Vaping status: Some Days    Substances: THC   Substance Use Topics    " "Alcohol use: Yes     Comment: occasional    Drug use: Yes     Types: Marijuana     Comment: Medical card      E-Cigarette/Vaping    E-Cigarette Use Current Some Day User       E-Cigarette/Vaping Substances    Nicotine No     THC Yes     CBD No     Flavoring No       I have reviewed and agree with the history as documented.     35 year old female presents to the ED for evaluation of \"severe headaches\" that come and go over the past few days. States this comes when she has sex, reports she believes it is because her  BP is going up. Also states this happened when she was sitting in traffic and had to go to the bathroom and had stoamch pain. States she reporst 5-6 episodes of this since Friday. States first headache came on Friday while she was having sex reports it was sudden in onset.  Notes current headache is the one that has lasted the longest. Denies history of migrains of headaches. Reports sensitivity to light and lound noises. No double vision or loss of vision. Reports yseterday she took aleve without improvement . No history of high blood pressure        Review of Systems   Constitutional: Negative.    Eyes:  Positive for photophobia.   Respiratory: Negative.     Cardiovascular: Negative.    Genitourinary: Negative.    Musculoskeletal: Negative.    Skin: Negative.    Neurological:  Positive for headaches. Negative for dizziness, speech difficulty and weakness.   All other systems reviewed and are negative.          Objective       ED Triage Vitals   Temperature Pulse Blood Pressure Respirations SpO2 Patient Position - Orthostatic VS   04/20/25 1638 04/20/25 1638 04/20/25 1640 04/20/25 1638 04/20/25 1638 04/20/25 1638   98.8 °F (37.1 °C) 85 133/94 16 98 % Sitting      Temp Source Heart Rate Source BP Location FiO2 (%) Pain Score    04/20/25 1638 04/20/25 1638 04/20/25 1638 -- 04/20/25 1638    Temporal Monitor Left arm  7      Vitals      Date and Time Temp Pulse SpO2 Resp BP Pain Score FACES Pain Rating User "   04/20/25 1800 -- 80 95 % 18 118/69 -- -- CG   04/20/25 1700 -- 83 95 % 18 125/72 -- -- CG   04/20/25 1651 -- -- -- -- -- 7 -- CG   04/20/25 1640 -- -- -- -- 133/94 -- -- PK   04/20/25 1638 98.8 °F (37.1 °C) 85 98 % 16 -- 7 -- PK            Physical Exam  Vitals and nursing note reviewed.   Constitutional:       General: She is not in acute distress.     Appearance: Normal appearance. She is not ill-appearing, toxic-appearing or diaphoretic.   HENT:      Head: Normocephalic.      Nose: Nose normal.   Eyes:      Conjunctiva/sclera: Conjunctivae normal.   Cardiovascular:      Rate and Rhythm: Normal rate and regular rhythm.   Pulmonary:      Effort: Pulmonary effort is normal.      Breath sounds: Normal breath sounds.   Abdominal:      General: Bowel sounds are normal. There is no distension.      Palpations: Abdomen is soft.      Tenderness: There is no abdominal tenderness.   Musculoskeletal:         General: Normal range of motion.   Skin:     General: Skin is warm and dry.      Findings: No bruising, erythema or rash.   Neurological:      General: No focal deficit present.      Mental Status: She is alert and oriented to person, place, and time.      GCS: GCS eye subscore is 4. GCS verbal subscore is 5. GCS motor subscore is 6.      Cranial Nerves: Cranial nerves 2-12 are intact. No facial asymmetry.      Sensory: Sensation is intact. No sensory deficit.      Motor: Motor function is intact.      Coordination: Coordination is intact.      Gait: Gait is intact.   Psychiatric:         Mood and Affect: Mood normal.         Results Reviewed       Procedure Component Value Units Date/Time    HS Troponin I 4hr [[id number]]     Lab Status: No result Specimen: Blood     HS Troponin 0hr (reflex protocol) [[id number]]  (Normal) Collected: 04/20/25 1707    Lab Status: Final result Specimen: Blood from Arm, Left Updated: 04/20/25 1745     hs TnI 0hr <2 ng/L     HS Troponin I 2hr [[id number]]     Lab Status: No result Specimen:  Blood     Comprehensive metabolic panel [[id number]] Collected: 04/20/25 1707    Lab Status: Final result Specimen: Blood from Arm, Left Updated: 04/20/25 1741     Sodium 136 mmol/L      Potassium 3.5 mmol/L      Chloride 104 mmol/L      CO2 24 mmol/L      ANION GAP 8 mmol/L      BUN 10 mg/dL      Creatinine 0.96 mg/dL      Glucose 115 mg/dL      Calcium 9.4 mg/dL      AST 23 U/L      ALT 24 U/L      Alkaline Phosphatase 90 U/L      Total Protein 7.0 g/dL      Albumin 4.4 g/dL      Total Bilirubin 0.44 mg/dL      eGFR 76 ml/min/1.73sq m     Narrative:      [employer] guidelines for Chronic Kidney Disease (CKD):     Stage 1 with normal or high GFR (GFR > 90 mL/min/1.73 square meters)    Stage 2 Mild CKD (GFR = 60-89 mL/min/1.73 square meters)    Stage 3A Moderate CKD (GFR = 45-59 mL/min/1.73 square meters)    Stage 3B Moderate CKD (GFR = 30-44 mL/min/1.73 square meters)    Stage 4 Severe CKD (GFR = 15-29 mL/min/1.73 square meters)    Stage 5 End Stage CKD (GFR <15 mL/min/1.73 square meters)  Note: GFR calculation is accurate only with a steady state creatinine    CBC and differential [[id number]]  (Abnormal) Collected: 04/20/25 1707    Lab Status: Final result Specimen: Blood from Arm, Left Updated: 04/20/25 1723     WBC 11.39 Thousand/uL      RBC 3.99 Million/uL      Hemoglobin 11.4 g/dL      Hematocrit 33.4 %      MCV 84 fL      MCH 28.6 pg      MCHC 34.1 g/dL      RDW 13.6 %      MPV 8.4 fL      Platelets 341 Thousands/uL      nRBC 0 /100 WBCs      Segmented % 76 %      Immature Grans % 0 %      Lymphocytes % 17 %      Monocytes % 5 %      Eosinophils Relative 1 %      Basophils Relative 1 %      Absolute Neutrophils 8.61 Thousands/µL      Absolute Immature Grans 0.04 Thousand/uL      Absolute Lymphocytes 1.96 Thousands/µL      Absolute Monocytes 0.56 Thousand/µL      Eosinophils Absolute 0.16 Thousand/µL      Basophils Absolute 0.06 Thousands/µL             CT head without contrast   Final  Interpretation by Theodore Mckinley MD (04/20 1740)      No acute intracranial abnormality.                  Workstation performed: [id number]             Procedures    ED Medication and Procedure Management   Prior to Admission Medications   Prescriptions Last Dose Informant Patient Reported? Taking?   escitalopram (LEXAPRO) 20 mg tablet   Yes No   famotidine (PEPCID) 20 mg tablet   Yes No   omeprazole (PriLOSEC) 40 MG capsule   Yes No   Sig: Take 40 mg by mouth daily   ondansetron (Zofran ODT) 4 mg disintegrating tablet   No No   Sig: Take 1 tablet (4 mg total) by mouth every 6 (six) hours as needed for nausea or vomiting   Patient not taking: Reported on 4/5/2023      Facility-Administered Medications: None     Discharge Medication List as of 4/20/2025  6:21 PM        CONTINUE these medications which have NOT CHANGED    Details   escitalopram (LEXAPRO) 20 mg tablet Starting Thu 1/26/2023, Historical Med      famotidine (PEPCID) 20 mg tablet Starting Mon 3/20/2023, Historical Med      omeprazole (PriLOSEC) 40 MG capsule Take 40 mg by mouth daily, Historical Med      ondansetron (Zofran ODT) 4 mg disintegrating tablet Take 1 tablet (4 mg total) by mouth every 6 (six) hours as needed for nausea or vomiting, Starting Mon 11/21/2022, Normal           No discharge procedures on file.  ED SEPSIS DOCUMENTATION   Time reflects when diagnosis was documented in both MDM as applicable and the Disposition within this note       Time User Action Codes Description Comment    4/20/2025  6:21 PM Manasa Motta Add [R51.9] Headache                  Manasa Motta PA-C  04/20/25 2025

## 2025-04-20 NOTE — DISCHARGE INSTRUCTIONS
Please follow-up with your family doctor.  Get plenty of rest and fluid intake.  Return with new or worsening symptoms.  CT head without contrast   Final Result      No acute intracranial abnormality.                  Workstation performed: [id number]

## 2025-04-24 ENCOUNTER — HOSPITAL ENCOUNTER (EMERGENCY)
Facility: HOSPITAL | Age: 35
Discharge: HOME | End: 2025-04-24
Attending: EMERGENCY MEDICINE
Payer: COMMERCIAL

## 2025-04-24 VITALS
DIASTOLIC BLOOD PRESSURE: 105 MMHG | WEIGHT: 270 LBS | BODY MASS INDEX: 37.8 KG/M2 | HEIGHT: 71 IN | RESPIRATION RATE: 19 BRPM | OXYGEN SATURATION: 100 % | TEMPERATURE: 97.8 F | HEART RATE: 66 BPM | SYSTOLIC BLOOD PRESSURE: 157 MMHG

## 2025-04-24 DIAGNOSIS — R51.9 HEADACHE: Primary | ICD-10-CM

## 2025-04-24 PROCEDURE — 96361 HYDRATE IV INFUSION ADD-ON: CPT

## 2025-04-24 PROCEDURE — 96365 THER/PROPH/DIAG IV INF INIT: CPT

## 2025-04-24 PROCEDURE — 96375 TX/PRO/DX INJ NEW DRUG ADDON: CPT

## 2025-04-24 PROCEDURE — 99283 EMERGENCY DEPT VISIT LOW MDM: CPT

## 2025-04-24 PROCEDURE — 99284 EMERGENCY DEPT VISIT MOD MDM: CPT | Performed by: EMERGENCY MEDICINE

## 2025-04-24 RX ORDER — KETOROLAC TROMETHAMINE 30 MG/ML
30 INJECTION, SOLUTION INTRAMUSCULAR; INTRAVENOUS ONCE
Status: COMPLETED | OUTPATIENT
Start: 2025-04-24 | End: 2025-04-24

## 2025-04-24 RX ORDER — METOCLOPRAMIDE HYDROCHLORIDE 5 MG/ML
10 INJECTION INTRAMUSCULAR; INTRAVENOUS ONCE
Status: COMPLETED | OUTPATIENT
Start: 2025-04-24 | End: 2025-04-24

## 2025-04-24 RX ORDER — DIPHENHYDRAMINE HYDROCHLORIDE 50 MG/ML
25 INJECTION, SOLUTION INTRAMUSCULAR; INTRAVENOUS ONCE
Status: COMPLETED | OUTPATIENT
Start: 2025-04-24 | End: 2025-04-24

## 2025-04-24 RX ORDER — MAGNESIUM SULFATE HEPTAHYDRATE 40 MG/ML
2 INJECTION, SOLUTION INTRAVENOUS ONCE
Status: COMPLETED | OUTPATIENT
Start: 2025-04-24 | End: 2025-04-24

## 2025-04-24 RX ADMIN — MAGNESIUM SULFATE HEPTAHYDRATE 2 G: 40 INJECTION, SOLUTION INTRAVENOUS at 03:36

## 2025-04-24 RX ADMIN — SODIUM CHLORIDE 1000 ML: 0.9 INJECTION, SOLUTION INTRAVENOUS at 03:34

## 2025-04-24 RX ADMIN — DIPHENHYDRAMINE HYDROCHLORIDE 25 MG: 50 INJECTION, SOLUTION INTRAMUSCULAR; INTRAVENOUS at 03:35

## 2025-04-24 RX ADMIN — METOCLOPRAMIDE HYDROCHLORIDE 10 MG: 5 INJECTION INTRAMUSCULAR; INTRAVENOUS at 03:36

## 2025-04-24 RX ADMIN — KETOROLAC TROMETHAMINE 30 MG: 30 INJECTION, SOLUTION INTRAMUSCULAR; INTRAVENOUS at 03:35

## 2025-04-24 NOTE — ED PROVIDER NOTES
Time reflects when diagnosis was documented in both MDM as applicable and the Disposition within this note       Time User Action Codes Description Comment    4/24/2025  4:38 AM Toi River Add [R51.9] Headache           ED Disposition       ED Disposition   Discharge    Condition   Stable    Date/Time   Thu Apr 24, 2025  4:38 AM    Comment   Mattie DAY Shistmaldonado discharge to home/self care.                   Assessment & Plan       Medical Decision Making  Based on the history and medical screening exam performed the patient may be at risk for migraine headache, cluster headache, tension headache, other headache.    Based on the work-up performed in the emergency room which includes physical examination, and which may include laboratory studies and imaging as warranted including advanced imaging such as CT scan or ultrasound, the diagnostic considerations are narrowed to exclude limb or life-threatening process.    The patient is stable for discharge.  Patient remains hemodynamically stable and neurologically intact.  Headache improving.  No indication for imaging at this time.    Risk  Prescription drug management.        ED Course as of 04/24/25 0439   Thu Apr 24, 2025   0400 Pain now improved from 10/10 to 6/10   0438 Headache continues to improve and patient is now sleeping soundly.       Medications   sodium chloride 0.9 % bolus 1,000 mL (1,000 mL Intravenous New Bag 4/24/25 0334)   ketorolac (TORADOL) injection 30 mg (30 mg Intravenous Given 4/24/25 0335)   diphenhydrAMINE (BENADRYL) injection 25 mg (25 mg Intravenous Given 4/24/25 0335)   metoclopramide (REGLAN) injection 10 mg (10 mg Intravenous Given 4/24/25 0336)   magnesium sulfate 2 g/50 mL IVPB (premix) 2 g (0 g Intravenous Stopped 4/24/25 0407)       ED Risk Strat Scores                    No data recorded                            History of Present Illness       Chief Complaint   Patient presents with    Headache - Recurrent or Known Dx  Migraines     Pt began having a migraine last night and took an imitrex for the first time. Pt began vomiting and having abd pain ever since. Pt still has migraine as well.        Past Medical History:   Diagnosis Date    Asthma     Fibroid, uterine     GERD (gastroesophageal reflux disease)       Past Surgical History:   Procedure Laterality Date    TONSILLECTOMY        Family History   Problem Relation Age of Onset    Hyperlipidemia Mother     Diabetes Mother     No Known Problems Father       Social History     Tobacco Use    Smoking status: Never    Smokeless tobacco: Never   Vaping Use    Vaping status: Some Days    Substances: THC   Substance Use Topics    Alcohol use: Yes     Comment: occasional    Drug use: Yes     Types: Marijuana     Comment: Medical card      E-Cigarette/Vaping    E-Cigarette Use Current Some Day User       E-Cigarette/Vaping Substances    Nicotine No     THC Yes     CBD No     Flavoring No       I have reviewed and agree with the history as documented.     Migraine headache since 9 PM.  Similar to prior migraine headaches with retro-orbital pain and nausea and vomiting.  No weakness or numbness or tingling.  No visual change.  No other complaints.      History provided by:  Patient   used: No    Headache - Recurrent or Known Dx Migraines  Location: Bilateral retro-orbital.  Quality:  Dull  Radiates to:  Does not radiate  Severity currently:  10/10  Severity at highest:  10/10  Onset quality:  Gradual  Duration:  6 hours  Timing:  Constant  Progression:  Unchanged  Chronicity:  New  Relieved by:  Nothing  Worsened by:  Nothing  Ineffective treatments:  None tried  Associated symptoms: nausea and vomiting    Associated symptoms: no abdominal pain, no blurred vision, no cough, no diarrhea, no dizziness, no ear pain, no eye pain, no fever, no focal weakness, no hearing loss, no loss of balance, no neck pain, no neck stiffness, no numbness, no paresthesias, no seizures, no  sore throat, no syncope, no visual change and no weakness        Review of Systems   Constitutional:  Negative for chills and fever.   HENT:  Negative for ear pain, hearing loss, sore throat, trouble swallowing and voice change.    Eyes:  Negative for blurred vision, pain and discharge.   Respiratory:  Negative for cough, shortness of breath and wheezing.    Cardiovascular:  Negative for chest pain, palpitations and syncope.   Gastrointestinal:  Positive for nausea and vomiting. Negative for abdominal pain, blood in stool, constipation and diarrhea.   Genitourinary:  Negative for dysuria, flank pain, frequency and hematuria.   Musculoskeletal:  Negative for joint swelling, neck pain and neck stiffness.   Skin:  Negative for rash and wound.   Neurological:  Positive for headaches. Negative for dizziness, focal weakness, seizures, syncope, facial asymmetry, weakness, numbness, paresthesias and loss of balance.   Psychiatric/Behavioral:  Negative for hallucinations, self-injury and suicidal ideas.    All other systems reviewed and are negative.          Objective       ED Triage Vitals   Temperature Pulse Blood Pressure Respirations SpO2 Patient Position - Orthostatic VS   04/24/25 0257 04/24/25 0257 04/24/25 0257 04/24/25 0257 04/24/25 0257 --   97.8 °F (36.6 °C) 66 (!) 157/105 19 100 %       Temp Source Heart Rate Source BP Location FiO2 (%) Pain Score    04/24/25 0257 04/24/25 0257 -- -- 04/24/25 0335    Temporal Monitor   8      Vitals      Date and Time Temp Pulse SpO2 Resp BP Pain Score FACES Pain Rating User   04/24/25 0406 -- -- -- -- -- 8 -- TH 04/24/25 0335 -- -- -- -- -- 8 -- TH 04/24/25 0257 97.8 °F (36.6 °C) 66 100 % 19 157/105 -- -- RG            Physical Exam  Vitals and nursing note reviewed.   Constitutional:       General: She is not in acute distress.     Appearance: She is well-developed. She is not ill-appearing or diaphoretic.   HENT:      Head: Normocephalic and atraumatic.      Right Ear:  External ear normal.      Left Ear: External ear normal.   Eyes:      General: No scleral icterus.        Right eye: No discharge.         Left eye: No discharge.      Extraocular Movements: Extraocular movements intact.      Conjunctiva/sclera: Conjunctivae normal.   Pulmonary:      Effort: Pulmonary effort is normal. No respiratory distress.   Musculoskeletal:         General: No deformity or signs of injury. Normal range of motion.      Cervical back: Normal range of motion and neck supple.   Skin:     General: Skin is warm and dry.      Coloration: Skin is not jaundiced or pale.   Neurological:      General: No focal deficit present.      Mental Status: She is alert and oriented to person, place, and time.      Cranial Nerves: No cranial nerve deficit.      Motor: No weakness.      Coordination: Coordination normal.      Gait: Gait normal.   Psychiatric:         Mood and Affect: Mood normal.         Behavior: Behavior normal.         Thought Content: Thought content normal.         Judgment: Judgment normal.         Results Reviewed       None            No orders to display       Procedures    ED Medication and Procedure Management   Prior to Admission Medications   Prescriptions Last Dose Informant Patient Reported? Taking?   escitalopram (LEXAPRO) 20 mg tablet   Yes No   famotidine (PEPCID) 20 mg tablet   Yes No   omeprazole (PriLOSEC) 40 MG capsule   Yes No   Sig: Take 40 mg by mouth daily   ondansetron (Zofran ODT) 4 mg disintegrating tablet   No No   Sig: Take 1 tablet (4 mg total) by mouth every 6 (six) hours as needed for nausea or vomiting   Patient not taking: Reported on 4/5/2023      Facility-Administered Medications: None     Patient's Medications   Discharge Prescriptions    No medications on file     No discharge procedures on file.  ED SEPSIS DOCUMENTATION   Time reflects when diagnosis was documented in both MDM as applicable and the Disposition within this note       Time User Action Codes  Description Comment    4/24/2025  4:38 AM Toi River Add [R51.9] Headache                  Toi River MD  04/24/25 0439

## 2025-05-10 ENCOUNTER — HOSPITAL ENCOUNTER (EMERGENCY)
Facility: HOSPITAL | Age: 35
Discharge: HOME | End: 2025-05-10
Attending: EMERGENCY MEDICINE | Admitting: EMERGENCY MEDICINE
Payer: COMMERCIAL

## 2025-05-10 ENCOUNTER — APPOINTMENT (EMERGENCY)
Dept: RADIOLOGY | Facility: HOSPITAL | Age: 35
End: 2025-05-10
Payer: COMMERCIAL

## 2025-05-10 VITALS
DIASTOLIC BLOOD PRESSURE: 78 MMHG | OXYGEN SATURATION: 100 % | BODY MASS INDEX: 33.79 KG/M2 | SYSTOLIC BLOOD PRESSURE: 130 MMHG | WEIGHT: 242.29 LBS | RESPIRATION RATE: 20 BRPM | HEART RATE: 95 BPM | TEMPERATURE: 97.5 F

## 2025-05-10 DIAGNOSIS — J06.9 URI (UPPER RESPIRATORY INFECTION): Primary | ICD-10-CM

## 2025-05-10 PROCEDURE — 96372 THER/PROPH/DIAG INJ SC/IM: CPT

## 2025-05-10 PROCEDURE — 99283 EMERGENCY DEPT VISIT LOW MDM: CPT

## 2025-05-10 PROCEDURE — 94640 AIRWAY INHALATION TREATMENT: CPT

## 2025-05-10 PROCEDURE — 71046 X-RAY EXAM CHEST 2 VIEWS: CPT

## 2025-05-10 PROCEDURE — 99284 EMERGENCY DEPT VISIT MOD MDM: CPT | Performed by: EMERGENCY MEDICINE

## 2025-05-10 RX ORDER — IPRATROPIUM BROMIDE AND ALBUTEROL SULFATE 2.5; .5 MG/3ML; MG/3ML
3 SOLUTION RESPIRATORY (INHALATION) ONCE
Status: COMPLETED | OUTPATIENT
Start: 2025-05-10 | End: 2025-05-10

## 2025-05-10 RX ORDER — PREDNISONE 20 MG/1
40 TABLET ORAL DAILY
Qty: 8 TABLET | Refills: 0 | Status: SHIPPED | OUTPATIENT
Start: 2025-05-10 | End: 2025-05-14

## 2025-05-10 RX ORDER — DOXYCYCLINE 100 MG/1
100 CAPSULE ORAL ONCE
Status: COMPLETED | OUTPATIENT
Start: 2025-05-10 | End: 2025-05-10

## 2025-05-10 RX ORDER — METHYLPREDNISOLONE SODIUM SUCCINATE 125 MG/2ML
60 INJECTION, POWDER, LYOPHILIZED, FOR SOLUTION INTRAMUSCULAR; INTRAVENOUS ONCE
Status: COMPLETED | OUTPATIENT
Start: 2025-05-10 | End: 2025-05-10

## 2025-05-10 RX ORDER — BENZONATATE 200 MG/1
200 CAPSULE ORAL 3 TIMES DAILY PRN
Qty: 9 CAPSULE | Refills: 0 | Status: SHIPPED | OUTPATIENT
Start: 2025-05-10 | End: 2025-05-13

## 2025-05-10 RX ORDER — BENZONATATE 100 MG/1
200 CAPSULE ORAL ONCE
Status: COMPLETED | OUTPATIENT
Start: 2025-05-10 | End: 2025-05-10

## 2025-05-10 RX ORDER — ALBUTEROL SULFATE 90 UG/1
2 INHALANT RESPIRATORY (INHALATION) EVERY 6 HOURS PRN
Qty: 6.7 G | Refills: 0 | Status: SHIPPED | OUTPATIENT
Start: 2025-05-10

## 2025-05-10 RX ORDER — DOXYCYCLINE 100 MG/1
100 CAPSULE ORAL 2 TIMES DAILY
Qty: 14 CAPSULE | Refills: 0 | Status: SHIPPED | OUTPATIENT
Start: 2025-05-10 | End: 2025-05-17

## 2025-05-10 RX ADMIN — IPRATROPIUM BROMIDE AND ALBUTEROL SULFATE 3 ML: 2.5; .5 SOLUTION RESPIRATORY (INHALATION) at 19:23

## 2025-05-10 RX ADMIN — DOXYCYCLINE 100 MG: 100 CAPSULE ORAL at 19:23

## 2025-05-10 RX ADMIN — BENZONATATE 200 MG: 100 CAPSULE ORAL at 19:23

## 2025-05-10 RX ADMIN — METHYLPREDNISOLONE SODIUM SUCCINATE 60 MG: 125 INJECTION, POWDER, FOR SOLUTION INTRAMUSCULAR; INTRAVENOUS at 19:23

## 2025-05-10 NOTE — ED PROVIDER NOTES
Time reflects when diagnosis was documented in both MDM as applicable and the Disposition within this note       Time User Action Codes Description Comment    5/10/2025  8:02 PM Dana Cooper Add [J06.9] URI (upper respiratory infection)           ED Disposition       ED Disposition   Discharge    Condition   Stable    Date/Time   Sat May 10, 2025  8:02 PM    Comment   Mattie DAY Shistle discharge to home/self care.                   Assessment & Plan       Medical Decision Making  Differential diagnosis includes but not limited to: Pneumonia, bronchitis, asthma exacerbation    Amount and/or Complexity of Data Reviewed  Radiology: ordered and independent interpretation performed.    Risk  Prescription drug management.             Medications   methylPREDNISolone sodium succinate (Solu-MEDROL) injection 60 mg (60 mg Intramuscular Given 5/10/25 1923)   ipratropium-albuterol (DUO-NEB) 0.5-2.5 mg/3 mL inhalation solution 3 mL (3 mL Nebulization Given 5/10/25 1923)   benzonatate (TESSALON PERLES) capsule 200 mg (200 mg Oral Given 5/10/25 1923)   doxycycline hyclate (VIBRAMYCIN) capsule 100 mg (100 mg Oral Given 5/10/25 1923)       ED Risk Strat Scores                    No data recorded        SBIRT 22yo+      Flowsheet Row Most Recent Value   Initial Alcohol Screen: US AUDIT-C     1. How often do you have a drink containing alcohol? 0 Filed at: 05/10/2025 1818   2. How many drinks containing alcohol do you have on a typical day you are drinking?  0 Filed at: 05/10/2025 1818   3b. FEMALE Any Age, or MALE 65+: How often do you have 4 or more drinks on one occassion? 0 Filed at: 05/10/2025 1818   Audit-C Score 0 Filed at: 05/10/2025 1818   LI: How many times in the past year have you...    Used an illegal drug or used a prescription medication for non-medical reasons? Never Filed at: 05/10/2025 1818                            History of Present Illness       Chief Complaint   Patient presents with    Cough     Patient  presents to the ED with complaints of cough and shortness of breath that began on Monday.        Past Medical History:   Diagnosis Date    Asthma     Fibroid, uterine     GERD (gastroesophageal reflux disease)       Past Surgical History:   Procedure Laterality Date    TONSILLECTOMY        Family History   Problem Relation Age of Onset    Hyperlipidemia Mother     Diabetes Mother     No Known Problems Father       Social History     Tobacco Use    Smoking status: Never    Smokeless tobacco: Never   Vaping Use    Vaping status: Some Days    Substances: THC   Substance Use Topics    Alcohol use: Yes     Comment: occasional    Drug use: Yes     Types: Marijuana     Comment: Medical card      E-Cigarette/Vaping    E-Cigarette Use Current Some Day User       E-Cigarette/Vaping Substances    Nicotine No     THC Yes     CBD No     Flavoring No       I have reviewed and agree with the history as documented.     This is a 35-year-old female presenting to the ED for evaluation of productive cough and shortness of breath that began on Monday.  Patient states that she has been using her inhaler without much improvement.  She denies any chills but admits to low-grade fever.        Review of Systems   Constitutional:  Positive for fever.   HENT:  Negative for ear pain and sore throat.    Eyes:  Negative for pain and visual disturbance.   Respiratory:  Positive for cough and shortness of breath.    Cardiovascular:  Negative for chest pain and palpitations.   Gastrointestinal:  Negative for abdominal pain and vomiting.   Genitourinary:  Negative for dysuria and hematuria.   Musculoskeletal:  Negative for arthralgias and back pain.   Skin:  Negative for color change and rash.   Neurological:  Negative for seizures and syncope.   All other systems reviewed and are negative.          Objective       ED Triage Vitals [05/10/25 1818]   Temperature Pulse Blood Pressure Respirations SpO2 Patient Position - Orthostatic VS   97.5 °F (36.4  °C) (!) 109 167/83 18 98 % Sitting      Temp Source Heart Rate Source BP Location FiO2 (%) Pain Score    Temporal Monitor Right arm -- 4      Vitals      Date and Time Temp Pulse SpO2 Resp BP Pain Score FACES Pain Rating User   05/10/25 2000 -- 95 100 % 20 130/78 -- -- MD   05/10/25 1818 97.5 °F (36.4 °C) 109 98 % 18 167/83 4 -- RR            Physical Exam  Vitals and nursing note reviewed.   Constitutional:       General: She is not in acute distress.     Appearance: Normal appearance. She is well-developed.   HENT:      Head: Normocephalic and atraumatic.      Right Ear: External ear normal.      Left Ear: External ear normal.      Nose: Nose normal.   Eyes:      Extraocular Movements: Extraocular movements intact.      Conjunctiva/sclera: Conjunctivae normal.   Cardiovascular:      Rate and Rhythm: Normal rate and regular rhythm.      Heart sounds: No murmur heard.  Pulmonary:      Effort: Pulmonary effort is normal. No respiratory distress.      Breath sounds: Wheezing present.   Abdominal:      General: Abdomen is flat. Bowel sounds are normal.      Palpations: Abdomen is soft.      Tenderness: There is no abdominal tenderness.   Musculoskeletal:         General: No swelling. Normal range of motion.      Cervical back: Normal range of motion and neck supple.   Skin:     General: Skin is warm and dry.      Capillary Refill: Capillary refill takes less than 2 seconds.   Neurological:      General: No focal deficit present.      Mental Status: She is alert and oriented to person, place, and time. Mental status is at baseline.   Psychiatric:         Mood and Affect: Mood normal.         Results Reviewed       None            XR chest 2 views   ED Interpretation by Dana Cooper DO (05/10 2002)   ?inflitrate +incr bronchial markings          Procedures    ED Medication and Procedure Management   Prior to Admission Medications   Prescriptions Last Dose Informant Patient Reported? Taking?   escitalopram  (LEXAPRO) 20 mg tablet   Yes No   famotidine (PEPCID) 20 mg tablet   Yes No   omeprazole (PriLOSEC) 40 MG capsule   Yes No   Sig: Take 40 mg by mouth daily   ondansetron (Zofran ODT) 4 mg disintegrating tablet   No No   Sig: Take 1 tablet (4 mg total) by mouth every 6 (six) hours as needed for nausea or vomiting   Patient not taking: Reported on 4/5/2023      Facility-Administered Medications: None     Discharge Medication List as of 5/10/2025  8:05 PM        START taking these medications    Details   albuterol (Proventil HFA) 90 mcg/act inhaler Inhale 2 puffs every 6 (six) hours as needed for wheezing, Starting Sat 5/10/2025, Normal      benzonatate (TESSALON) 200 MG capsule Take 1 capsule (200 mg total) by mouth 3 (three) times a day as needed for cough for up to 3 days, Starting Sat 5/10/2025, Until Tue 5/13/2025 at 2359, Normal      doxycycline hyclate (VIBRAMYCIN) 100 mg capsule Take 1 capsule (100 mg total) by mouth 2 (two) times a day for 7 days, Starting Sat 5/10/2025, Until Sat 5/17/2025, Normal      predniSONE 20 mg tablet Take 2 tablets (40 mg total) by mouth daily for 4 days, Starting Sat 5/10/2025, Until Wed 5/14/2025, Normal           CONTINUE these medications which have NOT CHANGED    Details   escitalopram (LEXAPRO) 20 mg tablet Starting Thu 1/26/2023, Historical Med      famotidine (PEPCID) 20 mg tablet Starting Mon 3/20/2023, Historical Med      omeprazole (PriLOSEC) 40 MG capsule Take 40 mg by mouth daily, Historical Med      ondansetron (Zofran ODT) 4 mg disintegrating tablet Take 1 tablet (4 mg total) by mouth every 6 (six) hours as needed for nausea or vomiting, Starting Mon 11/21/2022, Normal           No discharge procedures on file.  ED SEPSIS DOCUMENTATION   Time reflects when diagnosis was documented in both MDM as applicable and the Disposition within this note       Time User Action Codes Description Comment    5/10/2025  8:02 PM Dana Cooper Add [J06.9] URI (upper respiratory  infection)                  Dana Cooper DO  05/10/25 2303

## 2025-05-11 NOTE — ED NOTES
Pt in no acute distress. Ambulates with a steady gait. Verbalizes understanding of discharge instructions       Yara Maxwell RN  05/10/25 2016

## 2025-07-11 ENCOUNTER — OFFICE VISIT (OUTPATIENT)
Dept: OBGYN CLINIC | Facility: CLINIC | Age: 35
End: 2025-07-11
Payer: COMMERCIAL

## 2025-07-11 ENCOUNTER — HOSPITAL ENCOUNTER (EMERGENCY)
Facility: HOSPITAL | Age: 35
Discharge: HOME | End: 2025-07-11
Attending: EMERGENCY MEDICINE
Payer: COMMERCIAL

## 2025-07-11 ENCOUNTER — APPOINTMENT (EMERGENCY)
Dept: RADIOLOGY | Facility: HOSPITAL | Age: 35
End: 2025-07-11
Payer: COMMERCIAL

## 2025-07-11 VITALS — HEIGHT: 71 IN | BODY MASS INDEX: 30.94 KG/M2 | WEIGHT: 221 LBS

## 2025-07-11 VITALS
SYSTOLIC BLOOD PRESSURE: 114 MMHG | TEMPERATURE: 98.4 F | OXYGEN SATURATION: 98 % | RESPIRATION RATE: 16 BRPM | BODY MASS INDEX: 31.79 KG/M2 | WEIGHT: 227.96 LBS | HEART RATE: 94 BPM | DIASTOLIC BLOOD PRESSURE: 64 MMHG

## 2025-07-11 DIAGNOSIS — M25.561 ACUTE PAIN OF RIGHT KNEE: Primary | ICD-10-CM

## 2025-07-11 DIAGNOSIS — S83.91XA RIGHT KNEE SPRAIN: Primary | ICD-10-CM

## 2025-07-11 PROCEDURE — 73560 X-RAY EXAM OF KNEE 1 OR 2: CPT

## 2025-07-11 PROCEDURE — 99283 EMERGENCY DEPT VISIT LOW MDM: CPT

## 2025-07-11 PROCEDURE — 99203 OFFICE O/P NEW LOW 30 MIN: CPT | Performed by: ORTHOPAEDIC SURGERY

## 2025-07-11 PROCEDURE — 99284 EMERGENCY DEPT VISIT MOD MDM: CPT | Performed by: EMERGENCY MEDICINE

## 2025-07-11 RX ORDER — IBUPROFEN 600 MG/1
600 TABLET, FILM COATED ORAL ONCE
Status: COMPLETED | OUTPATIENT
Start: 2025-07-11 | End: 2025-07-11

## 2025-07-11 RX ORDER — SUMATRIPTAN SUCCINATE 100 MG/1
100 TABLET ORAL DAILY PRN
COMMUNITY
Start: 2025-04-21

## 2025-07-11 RX ORDER — TOPIRAMATE 25 MG/1
25 TABLET, FILM COATED ORAL 2 TIMES DAILY
COMMUNITY
Start: 2025-05-01

## 2025-07-11 RX ORDER — TIRZEPATIDE 5 MG/.5ML
INJECTION, SOLUTION SUBCUTANEOUS
COMMUNITY
Start: 2025-06-09

## 2025-07-11 RX ORDER — VERAPAMIL HYDROCHLORIDE 80 MG/1
80 TABLET ORAL DAILY
COMMUNITY
Start: 2025-05-01

## 2025-07-11 RX ADMIN — IBUPROFEN 600 MG: 600 TABLET, FILM COATED ORAL at 09:24

## 2025-07-11 NOTE — ED PROVIDER NOTES
Time reflects when diagnosis was documented in both MDM as applicable and the Disposition within this note       Time User Action Codes Description Comment    7/11/2025  9:16 AM Philip Kim Add [S83.91XA] Right knee sprain           ED Disposition       ED Disposition   Discharge    Condition   Stable    Date/Time   Fri Jul 11, 2025  9:16 AM    Comment   Mattie Morillo discharge to home/self care.                   Assessment & Plan       Medical Decision Making  0848: Patient appears well, vital signs reviewed.  Concerns for sprain of the right knee.  Plan to complete x-rays to screen for bony injury.  I will give ibuprofen for discomfort and reevaluate.  Anticipate need for knee immobilizer, crutches and orthopedic follow-up.    Amount and/or Complexity of Data Reviewed  Radiology: ordered.     Details: Right knee x-rays--no fracture    Risk  Prescription drug management.             Medications   ibuprofen (MOTRIN) tablet 600 mg (600 mg Oral Given 7/11/25 0924)       ED Risk Strat Scores                    No data recorded        SBIRT 20yo+      Flowsheet Row Most Recent Value   Initial Alcohol Screen: US AUDIT-C     1. How often do you have a drink containing alcohol? 0 Filed at: 07/11/2025 0851   2. How many drinks containing alcohol do you have on a typical day you are drinking?  0 Filed at: 07/11/2025 0851   3b. FEMALE Any Age, or MALE 65+: How often do you have 4 or more drinks on one occassion? 0 Filed at: 07/11/2025 0851   Audit-C Score 0 Filed at: 07/11/2025 0851   LI: How many times in the past year have you...    Used an illegal drug or used a prescription medication for non-medical reasons? Never Filed at: 07/11/2025 0851                            History of Present Illness       Chief Complaint   Patient presents with    Knee Pain     Last night at softball practice, patient hurt her right knee and it gave out when she threw the ball.       Past Medical History[1]   Past Surgical History[2]    Family History[3]   Social History[4]   E-Cigarette/Vaping    E-Cigarette Use Current Some Day User       E-Cigarette/Vaping Substances    Nicotine No     THC Yes     CBD No     Flavoring No       I have reviewed and agree with the history as documented.       History provided by:  Medical records and patient  Knee Pain  Location:  Knee  Time since incident:  1 day  Injury: yes    Mechanism of injury comment:  Twisting injury, throwing softball at practice yesterday, felt sliding of knee  Knee location:  R knee  Pain details:     Quality:  Aching and dull    Radiates to:  Does not radiate    Severity:  Mild    Onset quality:  Sudden    Duration:  1 day    Timing:  Constant    Progression:  Unchanged  Chronicity:  New  Dislocation: no    Foreign body present:  No foreign bodies  Prior injury to area:  Yes (hx of [place] disease in adolescents)  Relieved by:  Rest  Worsened by:  Bearing weight  Ineffective treatments:  None tried  Associated symptoms: no back pain, no decreased ROM, no fatigue, no fever, no itching, no muscle weakness, no neck pain, no numbness, no stiffness, no swelling and no tingling        Review of Systems   Constitutional:  Negative for chills, fatigue and fever.   HENT:  Negative for ear discharge, ear pain, rhinorrhea and sore throat.    Eyes:  Negative for pain and visual disturbance.   Respiratory:  Negative for cough and shortness of breath.    Cardiovascular:  Negative for chest pain and palpitations.   Gastrointestinal:  Negative for abdominal pain, diarrhea, nausea and vomiting.   Endocrine: Negative for polydipsia, polyphagia and polyuria.   Genitourinary:  Negative for difficulty urinating, dysuria, flank pain and hematuria.   Musculoskeletal:  Positive for arthralgias. Negative for back pain, neck pain and stiffness.   Skin:  Negative for color change, itching and rash.   Allergic/Immunologic: Negative for immunocompromised state.   Neurological:  Negative for dizziness,  seizures, syncope, weakness and headaches.   Psychiatric/Behavioral:  Negative for confusion and self-injury. The patient is not nervous/anxious.    All other systems reviewed and are negative.          Objective       ED Triage Vitals [07/11/25 0852]   Temperature Pulse Blood Pressure Respirations SpO2 Patient Position - Orthostatic VS   98.4 °F (36.9 °C) 94 114/64 16 98 % Sitting      Temp Source Heart Rate Source BP Location FiO2 (%) Pain Score    Temporal Monitor Right arm -- 2      Vitals      Date and Time Temp Pulse SpO2 Resp BP Pain Score FACES Pain Rating User   07/11/25 0924 -- -- -- -- -- 5 -- SL   07/11/25 0852 98.4 °F (36.9 °C) 94 98 % 16 114/64 2 patient is comfortable until she moves the right leg -- MB            Physical Exam  Vitals and nursing note reviewed.   Constitutional:       General: She is not in acute distress.     Appearance: Normal appearance. She is not ill-appearing, toxic-appearing or diaphoretic.   HENT:      Head: Normocephalic and atraumatic.     Cardiovascular:      Rate and Rhythm: Normal rate and regular rhythm.      Pulses: Normal pulses.      Heart sounds:      No gallop.      Comments: +3 DP/PT pulses bilaterally  Pulmonary:      Effort: Pulmonary effort is normal. No respiratory distress.   Abdominal:      General: Bowel sounds are normal.     Musculoskeletal:         General: Signs of injury present. No swelling, tenderness or deformity.      Cervical back: Normal range of motion and neck supple.      Comments: Full ROM of right knee however this elicits discomfort, no swelling, no deformity, no overlying erythema or warmth.  No bony tenderness.     Skin:     General: Skin is warm and dry.      Capillary Refill: Capillary refill takes less than 2 seconds.     Neurological:      General: No focal deficit present.      Mental Status: She is alert and oriented to person, place, and time.      Cranial Nerves: No cranial nerve deficit.      Sensory: No sensory deficit.       Motor: No weakness.      Gait: Gait normal.     Psychiatric:         Mood and Affect: Mood normal.         Behavior: Behavior normal.         Thought Content: Thought content normal.         Judgment: Judgment normal.         Results Reviewed       None            XR knee 1 or 2 views right    (Results Pending)       Procedures    ED Medication and Procedure Management   Prior to Admission Medications   Prescriptions Last Dose Informant Patient Reported? Taking?   albuterol (Proventil HFA) 90 mcg/act inhaler   No No   Sig: Inhale 2 puffs every 6 (six) hours as needed for wheezing   escitalopram (LEXAPRO) 20 mg tablet   Yes No   famotidine (PEPCID) 20 mg tablet   Yes No   omeprazole (PriLOSEC) 40 MG capsule   Yes No   Sig: Take 40 mg by mouth daily   ondansetron (Zofran ODT) 4 mg disintegrating tablet   No No   Sig: Take 1 tablet (4 mg total) by mouth every 6 (six) hours as needed for nausea or vomiting   Patient not taking: Reported on 4/5/2023      Facility-Administered Medications: None     Discharge Medication List as of 7/11/2025  9:17 AM        CONTINUE these medications which have NOT CHANGED    Details   albuterol (Proventil HFA) 90 mcg/act inhaler Inhale 2 puffs every 6 (six) hours as needed for wheezing, Starting Sat 5/10/2025, Normal      escitalopram (LEXAPRO) 20 mg tablet Starting Thu 1/26/2023, Historical Med      famotidine (PEPCID) 20 mg tablet Starting Mon 3/20/2023, Historical Med      omeprazole (PriLOSEC) 40 MG capsule Take 40 mg by mouth daily, Historical Med      ondansetron (Zofran ODT) 4 mg disintegrating tablet Take 1 tablet (4 mg total) by mouth every 6 (six) hours as needed for nausea or vomiting, Starting Mon 11/21/2022, Normal           No discharge procedures on file.  ED SEPSIS DOCUMENTATION   Time reflects when diagnosis was documented in both MDM as applicable and the Disposition within this note       Time User Action Codes Description Comment    7/11/2025  9:16 AM Philip Kim  Add [S83.91XA] Right knee sprain                      [1]   Past Medical History:  Diagnosis Date    Asthma     Fibroid, uterine     GERD (gastroesophageal reflux disease)    [2]   Past Surgical History:  Procedure Laterality Date    TONSILLECTOMY     [3]   Family History  Problem Relation Name Age of Onset    Hyperlipidemia Mother      Diabetes Mother      No Known Problems Father     [4]   Social History  Tobacco Use    Smoking status: Never    Smokeless tobacco: Never   Vaping Use    Vaping status: Some Days    Substances: THC   Substance Use Topics    Alcohol use: Yes     Comment: occasional    Drug use: Yes     Types: Marijuana     Comment: Medical card        Philip Kim MD  07/11/25 0973

## 2025-07-11 NOTE — PROGRESS NOTES
"Name: Mattie Morillo      : 1990      MRN: [id number]  Encounter Provider: Garrick Ramos DO  Encounter Date: 2025   Encounter department: [facility] [place]  :  Assessment & Plan  Acute pain of right knee  I would recommend follow-up in 1 week.  I did offer her the option of physical therapy but she would prefer to monitor her symptoms and did not want to pursue a course of physical therapy at this time.  The office should be contacted if any questions or concerns arise prior to follow-up.  I did give her a note allowing her to remain out of work today but returning to work on 2025.           History of Present Illness   HPI  Mattie Morillo is a 35 y.o. female who presents after being seen in the emergency room earlier this morning for right knee injury that occurred at softball practice last evening 7/10/25. She was playing third base and fielded a ground ball and when she went to step and throw she felt as though her knee twisted some and she had immediate pain.  She does not recall the exact mechanism of injury, however. She went home.  She did not ice.  She notes that she has some pain in the front and back of the knee and has trouble straightening her knee completely and putting pressure on the knee.  At the emergency room they gave her crutches.  Has a history of [place] disease as an adolescent.  She denies any major knee injuries when she played softball in high school.  No reported pop but she does feel clicking in the knee that is painful.  She denies any swelling.    Review of Systems  Medications Ordered Prior to Encounter[1]   Social History[2]     Objective   Ht 5' 11\" (1.803 m)   Wt 100 kg (221 lb)   LMP 04/10/2023   BMI 30.82 kg/m²      Physical Exam    Right knee without any significant effusion erythema or warmth.  Somewhat ill-defined location of the discomfort but feels it is partially in the back in the and somewhat towards the " front of the knee below her kneecap.  Patient wants to hold the knee slightly flexed but when patient can relax the knee is able to be brought into full extension the patient is sitting with the legs up on the table.  When the patient transitions to sitting with the legs over the side of the bed she is able to flex the knee past 90 degrees without much discomfort.  She does complain of some pain below the kneecap area.  She can also be brought into extension completely without any pain.  There is no pain with patellar mobilization.  There is no discrete patella facet tenderness.  She has a slightly positive patellar hook test.  Gross exam of the ligaments with varus and valgus stress elicit no pain or increased laxity.  Lachman, anterior/posterior drawer elicits minimal translation but seems comparable to the other side.  With attempt at Lore testing there was a click palpated but seems to be more patellofemoral click rather than meniscal click.  She denies any joint line tenderness medially or laterally.  Apley's compression and distraction tests are negative.  Touch sensation is intact distally.  There is no pain with palpation over the head of the fibula or proximal tibia.  No pain over the femoral condyles or the patella.    I personally reviewed ER x-rays from this morning which demonstrate no evidence of bony injury or abnormalities.    The ER note was reviewed.    The x-ray report is pending.       [1]   Current Outpatient Medications on File Prior to Visit   Medication Sig Dispense Refill    albuterol (Proventil HFA) 90 mcg/act inhaler Inhale 2 puffs every 6 (six) hours as needed for wheezing 6.7 g 0    escitalopram (LEXAPRO) 20 mg tablet       famotidine (PEPCID) 20 mg tablet       ondansetron (Zofran ODT) 4 mg disintegrating tablet Take 1 tablet (4 mg total) by mouth every 6 (six) hours as needed for nausea or vomiting 20 tablet 0    SUMAtriptan (IMITREX) 100 mg tablet Take 100 mg by mouth daily as  needed for migraine at onset of migraine. may repeat in 2 hours but not more than 2 tabs in 24 hrs      topiramate (TOPAMAX) 25 mg tablet Take 25 mg by mouth 2 (two) times a day      Zepbound 5 MG/0.5ML auto-injector Inject 1 subcutaneously once a week      omeprazole (PriLOSEC) 40 MG capsule Take 40 mg by mouth daily (Patient not taking: Reported on 7/11/2025)      verapamil (CALAN) 80 mg tablet Take 80 mg by mouth daily (Patient not taking: Reported on 7/11/2025)       Current Facility-Administered Medications on File Prior to Visit   Medication Dose Route Frequency Provider Last Rate Last Admin    [COMPLETED] ibuprofen (MOTRIN) tablet 600 mg  600 mg Oral Once Philip Kim MD   600 mg at 07/11/25 0924   [2]   Social History  Tobacco Use    Smoking status: Never    Smokeless tobacco: Never   Vaping Use    Vaping status: Some Days    Substances: THC   Substance and Sexual Activity    Alcohol use: Yes     Comment: occasional    Drug use: Yes     Types: Marijuana     Comment: Medical card    Sexual activity: Yes

## 2025-07-11 NOTE — LETTER
July 11, 2025     Patient: Mattie Morilol  YOB: 1990  Date of Visit: 7/11/2025      To Whom it May Concern:    Mattie Morillo is under my professional care. Mattie was seen in my office on 7/11/2025. Mattie may return to work on 7/14/2025.  She is to remain out of work today, 7/11/2025.    If you have any questions or concerns, please don't hesitate to call.         Sincerely,          Garrick Ramos,         CC: No Recipients

## 2025-07-11 NOTE — Clinical Note
Mattie Morillo was seen and treated in our emergency department on 7/11/2025.        No sports until cleared by Family Doctor/Orthopedics        Diagnosis:     Mattie  .    She may return on this date:          If you have any questions or concerns, please don't hesitate to call.      Philip Kim MD    ______________________________           _______________          _______________  Hospital Representative                              Date                                Time